# Patient Record
Sex: FEMALE | Race: WHITE | NOT HISPANIC OR LATINO
[De-identification: names, ages, dates, MRNs, and addresses within clinical notes are randomized per-mention and may not be internally consistent; named-entity substitution may affect disease eponyms.]

---

## 2017-11-14 ENCOUNTER — TRANSCRIPTION ENCOUNTER (OUTPATIENT)
Age: 62
End: 2017-11-14

## 2017-11-15 ENCOUNTER — RESULT REVIEW (OUTPATIENT)
Age: 62
End: 2017-11-15

## 2017-11-15 ENCOUNTER — OUTPATIENT (OUTPATIENT)
Dept: OUTPATIENT SERVICES | Facility: HOSPITAL | Age: 62
LOS: 1 days | End: 2017-11-15
Payer: COMMERCIAL

## 2017-11-15 DIAGNOSIS — Z12.11 ENCOUNTER FOR SCREENING FOR MALIGNANT NEOPLASM OF COLON: ICD-10-CM

## 2017-11-15 DIAGNOSIS — K21.0 GASTRO-ESOPHAGEAL REFLUX DISEASE WITH ESOPHAGITIS: ICD-10-CM

## 2017-11-15 PROCEDURE — 88305 TISSUE EXAM BY PATHOLOGIST: CPT | Mod: 26

## 2017-11-15 PROCEDURE — 88305 TISSUE EXAM BY PATHOLOGIST: CPT

## 2017-11-15 PROCEDURE — 88312 SPECIAL STAINS GROUP 1: CPT | Mod: 26

## 2017-11-15 PROCEDURE — 43239 EGD BIOPSY SINGLE/MULTIPLE: CPT

## 2017-11-15 PROCEDURE — G0121: CPT

## 2017-11-15 PROCEDURE — 88312 SPECIAL STAINS GROUP 1: CPT

## 2017-11-16 LAB — SURGICAL PATHOLOGY FINAL REPORT - CH: SIGNIFICANT CHANGE UP

## 2018-08-22 ENCOUNTER — APPOINTMENT (OUTPATIENT)
Dept: CV DIAGNOSTICS | Facility: HOSPITAL | Age: 63
End: 2018-08-22

## 2018-08-22 ENCOUNTER — OUTPATIENT (OUTPATIENT)
Dept: OUTPATIENT SERVICES | Facility: HOSPITAL | Age: 63
LOS: 1 days | End: 2018-08-22
Payer: COMMERCIAL

## 2018-08-22 DIAGNOSIS — I25.10 ATHEROSCLEROTIC HEART DISEASE OF NATIVE CORONARY ARTERY WITHOUT ANGINA PECTORIS: ICD-10-CM

## 2018-08-22 PROCEDURE — 78452 HT MUSCLE IMAGE SPECT MULT: CPT | Mod: 26

## 2018-08-22 PROCEDURE — 78452 HT MUSCLE IMAGE SPECT MULT: CPT

## 2018-08-22 PROCEDURE — 93017 CV STRESS TEST TRACING ONLY: CPT

## 2018-08-22 PROCEDURE — 93016 CV STRESS TEST SUPVJ ONLY: CPT

## 2018-08-22 PROCEDURE — 93018 CV STRESS TEST I&R ONLY: CPT

## 2018-08-22 PROCEDURE — A9500: CPT

## 2019-10-04 ENCOUNTER — APPOINTMENT (OUTPATIENT)
Dept: CARDIOLOGY | Facility: CLINIC | Age: 64
End: 2019-10-04

## 2022-07-31 ENCOUNTER — EMERGENCY (EMERGENCY)
Facility: HOSPITAL | Age: 67
LOS: 1 days | Discharge: HOME | End: 2022-07-31
Attending: STUDENT IN AN ORGANIZED HEALTH CARE EDUCATION/TRAINING PROGRAM | Admitting: STUDENT IN AN ORGANIZED HEALTH CARE EDUCATION/TRAINING PROGRAM
Payer: COMMERCIAL

## 2022-07-31 VITALS
SYSTOLIC BLOOD PRESSURE: 160 MMHG | HEART RATE: 81 BPM | OXYGEN SATURATION: 98 % | WEIGHT: 119.05 LBS | HEIGHT: 64 IN | RESPIRATION RATE: 17 BRPM | DIASTOLIC BLOOD PRESSURE: 88 MMHG | TEMPERATURE: 98 F

## 2022-07-31 PROCEDURE — 99283 EMERGENCY DEPT VISIT LOW MDM: CPT | Mod: 25

## 2022-07-31 PROCEDURE — 99283 EMERGENCY DEPT VISIT LOW MDM: CPT

## 2022-07-31 PROCEDURE — 90471 IMMUNIZATION ADMIN: CPT

## 2022-07-31 PROCEDURE — 90715 TDAP VACCINE 7 YRS/> IM: CPT

## 2022-07-31 RX ORDER — CEPHALEXIN 500 MG
500 CAPSULE ORAL ONCE
Refills: 0 | Status: COMPLETED | OUTPATIENT
Start: 2022-07-31 | End: 2022-07-31

## 2022-07-31 RX ORDER — CEPHALEXIN 500 MG
1 CAPSULE ORAL
Qty: 21 | Refills: 0
Start: 2022-07-31 | End: 2022-08-06

## 2022-07-31 RX ORDER — TETANUS TOXOID, REDUCED DIPHTHERIA TOXOID AND ACELLULAR PERTUSSIS VACCINE, ADSORBED 5; 2.5; 8; 8; 2.5 [IU]/.5ML; [IU]/.5ML; UG/.5ML; UG/.5ML; UG/.5ML
0.5 SUSPENSION INTRAMUSCULAR ONCE
Refills: 0 | Status: COMPLETED | OUTPATIENT
Start: 2022-07-31 | End: 2022-07-31

## 2022-07-31 RX ADMIN — Medication 500 MILLIGRAM(S): at 19:14

## 2022-07-31 RX ADMIN — TETANUS TOXOID, REDUCED DIPHTHERIA TOXOID AND ACELLULAR PERTUSSIS VACCINE, ADSORBED 0.5 MILLILITER(S): 5; 2.5; 8; 8; 2.5 SUSPENSION INTRAMUSCULAR at 19:03

## 2022-07-31 NOTE — ED PROVIDER NOTE - CLINICAL SUMMARY MEDICAL DECISION MAKING FREE TEXT BOX
I, Feliberto Angeles MD, have seen and examined the patient on the date of service.  I agree with the BLADIMIR's assessment as written, with exceptions or additions as noted below or in a separate note. patient cut her thumb cleaning a kitchen appliance. exam with avulsion injury of pulp. nvi. no nailbed injury. a/p: avulsion. do not suspect bony injury. will clear, provide wound care, keflex, tdap and f/u. family agreeable with plan. will give plastic surgery follow up.

## 2022-07-31 NOTE — ED PROVIDER NOTE - PROGRESS NOTE DETAILS
skin missing unable to suture xeroform dressing and pressure dressing applied bleeding controlled abx tdap f/u hand

## 2022-07-31 NOTE — ED PROVIDER NOTE - PATIENT PORTAL LINK FT
You can access the FollowMyHealth Patient Portal offered by [facility] by registering at the following website: [email or web address]. By joining [employer]’s FollowMyHealth portal, you will also be able to view your health information using other applications (apps) compatible with our system.

## 2022-07-31 NOTE — ED PROVIDER NOTE - NSFOLLOWUPINSTRUCTIONS_ED_ALL_ED_FT
Follow up with hand   take antibiotics as directed  return to er for any worsening symptoms     Laceration Without Closure    WHAT YOU NEED TO KNOW:    Your laceration has gone past the time to be closed. Lacerations in areas of poor blood flow usually need to be closed within 8 hours. In areas with normal blood flow, lacerations usually need to be closed within 12 hours. Facial lacerations need to be closed within 24 hours. Your laceration has been cleaned and a dressing has been applied. Your laceration will heal on its own without sutures, staples, or other closure devices.     DISCHARGE INSTRUCTIONS:    Return to the emergency department if:   •You have redness, pain, or fever that gets worse quickly.      •Your wound has a bad smell or has pus draining from it.      •You have bleeding that does not stop after 10 minutes of holding firm, direct pressure on your wound.      Contact your healthcare provider if: You have questions or concerns about your condition or care.    Wound care:   •Keep the wound dry for the first 24 to 48 hours or as directed. Wash your hands with soap and warm water before and after you care for your wound. After that, gently clean the wound once or twice a day with cool water. Use soap to clean around the wound, but try not to get any on the wound edges. Do not use alcohol or hydrogen peroxide to clean your wound unless you are directed to do so.      •Leave your bandage on as long as directed. Bandages keep your wound clean and protected. They can also prevent swelling. Ask how to change and how often to change your bandage. Ask if you should apply antibacterial ointment. Be careful not to wrap the bandage or tape too tightly. This could cut off blood flow and cause more injury. Change your bandages when they get wet or dirty.      Follow up with your healthcare provider within 2 days or as directed: Write down your questions so you remember to ask them during your visits.       © Copyright [employer] 2022           back to top                          © Copyright [employer] 2022

## 2022-07-31 NOTE — ED PROVIDER NOTE - CARE PROVIDER_API CALL
Nish Pisano (MD)  Plastic Surgery  [address], [address]  [place], [place] [id number]  Phone: [phone]  Fax: [phone]  Follow Up Time:     Antony Montgomery  PLASTIC SURGERY  [address] , [address] [address]  [place] [address]  Phone: [phone]  Fax: [phone]  Follow Up Time:

## 2022-07-31 NOTE — ED PROVIDER NOTE - OBJECTIVE STATEMENT
Patient is a 67-year-old right-handed female here for right thumb laceration right while cleaning a new mandolin.  Patient states was not able to control the bleeding with so came to ER Tdap is not up-to-date denies any numbness tingling weakness.

## 2022-08-01 ENCOUNTER — TRANSCRIPTION ENCOUNTER (OUTPATIENT)
Age: 67
End: 2022-08-01

## 2022-10-17 ENCOUNTER — NON-APPOINTMENT (OUTPATIENT)
Age: 67
End: 2022-10-17

## 2022-10-17 DIAGNOSIS — M81.0 AGE-RELATED OSTEOPOROSIS W/OUT CURRENT PATHOLOGICAL FRACTURE: ICD-10-CM

## 2022-10-17 DIAGNOSIS — K52.89 OTHER SPECIFIED NONINFECTIVE GASTROENTERITIS AND COLITIS: ICD-10-CM

## 2022-10-17 DIAGNOSIS — R91.1 SOLITARY PULMONARY NODULE: ICD-10-CM

## 2022-10-17 DIAGNOSIS — Z87.898 PERSONAL HISTORY OF OTHER SPECIFIED CONDITIONS: ICD-10-CM

## 2022-10-17 DIAGNOSIS — Z87.19 PERSONAL HISTORY OF OTHER DISEASES OF THE DIGESTIVE SYSTEM: ICD-10-CM

## 2022-10-17 DIAGNOSIS — Z87.891 PERSONAL HISTORY OF NICOTINE DEPENDENCE: ICD-10-CM

## 2022-10-17 DIAGNOSIS — Z82.0 FAMILY HISTORY OF EPILEPSY AND OTHER DISEASES OF THE NERVOUS SYSTEM: ICD-10-CM

## 2022-10-17 RX ORDER — DENOSUMAB 60 MG/ML
60 INJECTION SUBCUTANEOUS
Refills: 0 | Status: ACTIVE | COMMUNITY

## 2022-10-17 RX ORDER — MESALAMINE 1.2 G/1
1.2 TABLET, DELAYED RELEASE ORAL DAILY
Refills: 0 | Status: ACTIVE | COMMUNITY

## 2022-10-17 RX ORDER — LETROZOLE TABLETS 2.5 MG/1
2.5 TABLET, FILM COATED ORAL
Refills: 0 | Status: ACTIVE | COMMUNITY

## 2022-10-17 RX ORDER — VIT C/E/ZN/COPPR/LUTEIN/ZEAXAN 250MG-90MG
CAPSULE ORAL
Refills: 0 | Status: ACTIVE | COMMUNITY

## 2022-10-17 RX ORDER — ACETAMINOPHEN 650 MG/1
650 TABLET, EXTENDED RELEASE ORAL 4 TIMES DAILY
Refills: 0 | Status: ACTIVE | COMMUNITY

## 2022-10-17 RX ORDER — CALCIUM CARBONATE/VITAMIN D3 600MG-5MCG
600-5 TABLET ORAL
Refills: 0 | Status: ACTIVE | COMMUNITY

## 2022-10-17 RX ORDER — GUAIFENESIN AND DEXTROMETHORPHAN HBR 20; 400 MG/1; MG/1
TABLET ORAL
Refills: 0 | Status: ACTIVE | COMMUNITY

## 2022-10-17 RX ORDER — IPRATROPIUM BROMIDE AND ALBUTEROL SULFATE 2.5; .5 MG/3ML; MG/3ML
SOLUTION RESPIRATORY (INHALATION)
Refills: 0 | Status: ACTIVE | COMMUNITY

## 2022-10-17 RX ORDER — FAMOTIDINE 40 MG/1
40 TABLET, FILM COATED ORAL DAILY
Refills: 0 | Status: ACTIVE | COMMUNITY

## 2023-05-16 ENCOUNTER — APPOINTMENT (OUTPATIENT)
Dept: CARDIOLOGY | Facility: CLINIC | Age: 68
End: 2023-05-16
Payer: COMMERCIAL

## 2023-05-16 VITALS
DIASTOLIC BLOOD PRESSURE: 90 MMHG | HEART RATE: 76 BPM | SYSTOLIC BLOOD PRESSURE: 150 MMHG | BODY MASS INDEX: 19.97 KG/M2 | TEMPERATURE: 97.2 F | HEIGHT: 64 IN | WEIGHT: 117 LBS | OXYGEN SATURATION: 98 %

## 2023-05-16 DIAGNOSIS — Z00.00 ENCOUNTER FOR GENERAL ADULT MEDICAL EXAMINATION W/OUT ABNORMAL FINDINGS: ICD-10-CM

## 2023-05-16 PROCEDURE — 99213 OFFICE O/P EST LOW 20 MIN: CPT

## 2023-05-16 RX ORDER — HYDROXYCHLOROQUINE SULFATE 200 MG/1
200 TABLET, FILM COATED ORAL DAILY
Refills: 0 | Status: DISCONTINUED | COMMUNITY
End: 2023-05-16

## 2023-05-16 NOTE — REASON FOR VISIT
[Hypertension] : hypertension [FreeTextEntry1] : 67 years old female status post lumpectomy for CA of the right breast, hypertension comes to the office for follow-up of her hypertension

## 2023-05-16 NOTE — ASSESSMENT
[FreeTextEntry1] : Patient blood pressure seems to be well controlled–BP was 140/90 taken by me.  Patient continue Cozaar 50 mg p.o. once a day patient will be seen in the office in 3 months patient will have a complete blood work and physical examinations at that time

## 2023-06-12 ENCOUNTER — APPOINTMENT (OUTPATIENT)
Dept: CARDIOLOGY | Facility: CLINIC | Age: 68
End: 2023-06-12
Payer: COMMERCIAL

## 2023-06-12 VITALS
BODY MASS INDEX: 20.32 KG/M2 | WEIGHT: 119 LBS | HEIGHT: 64 IN | DIASTOLIC BLOOD PRESSURE: 83 MMHG | HEART RATE: 78 BPM | OXYGEN SATURATION: 98 % | SYSTOLIC BLOOD PRESSURE: 130 MMHG

## 2023-06-12 DIAGNOSIS — J20.9 ACUTE BRONCHITIS, UNSPECIFIED: ICD-10-CM

## 2023-06-12 DIAGNOSIS — J42 ACUTE BRONCHITIS, UNSPECIFIED: ICD-10-CM

## 2023-06-12 PROCEDURE — 99213 OFFICE O/P EST LOW 20 MIN: CPT

## 2023-06-12 RX ORDER — BENZONATATE 100 MG/1
100 CAPSULE ORAL 3 TIMES DAILY
Qty: 30 | Refills: 1 | Status: ACTIVE | COMMUNITY
Start: 2023-06-12 | End: 1900-01-01

## 2023-06-12 RX ORDER — LEVOFLOXACIN 500 MG/1
500 TABLET, FILM COATED ORAL DAILY
Qty: 10 | Refills: 0 | Status: ACTIVE | COMMUNITY
Start: 2023-06-12 | End: 1900-01-01

## 2023-06-12 RX ORDER — PREDNISONE 10 MG/1
10 TABLET ORAL
Qty: 1 | Refills: 0 | Status: ACTIVE | COMMUNITY
Start: 2023-06-12 | End: 1900-01-01

## 2023-06-12 NOTE — ASSESSMENT
[FreeTextEntry1] : Patient's symptoms are consistent with acute bronchitis.  Patient was given Levaquin, Tessalon, and prednisone milligram p.o. twice a day for 1 week 10 mg p.o. once a day for another 1week

## 2023-08-14 ENCOUNTER — APPOINTMENT (OUTPATIENT)
Dept: CARDIOLOGY | Facility: CLINIC | Age: 68
End: 2023-08-14
Payer: COMMERCIAL

## 2023-08-14 VITALS
HEIGHT: 64 IN | BODY MASS INDEX: 20.14 KG/M2 | OXYGEN SATURATION: 98 % | SYSTOLIC BLOOD PRESSURE: 131 MMHG | WEIGHT: 118 LBS | HEART RATE: 67 BPM | DIASTOLIC BLOOD PRESSURE: 78 MMHG

## 2023-08-14 DIAGNOSIS — Z98.890 OTHER SPECIFIED POSTPROCEDURAL STATES: ICD-10-CM

## 2023-08-14 DIAGNOSIS — C50.911 MALIGNANT NEOPLASM OF UNSPECIFIED SITE OF RIGHT FEMALE BREAST: ICD-10-CM

## 2023-08-14 DIAGNOSIS — I10 ESSENTIAL (PRIMARY) HYPERTENSION: ICD-10-CM

## 2023-08-14 DIAGNOSIS — M85.80 OTHER SPECIFIED DISORDERS OF BONE DENSITY AND STRUCTURE, UNSPECIFIED SITE: ICD-10-CM

## 2023-08-14 DIAGNOSIS — R00.2 PALPITATIONS: ICD-10-CM

## 2023-08-14 PROCEDURE — 93000 ELECTROCARDIOGRAM COMPLETE: CPT

## 2023-08-14 PROCEDURE — 99397 PER PM REEVAL EST PAT 65+ YR: CPT

## 2023-08-14 NOTE — HISTORY OF PRESENT ILLNESS
[FreeTextEntry1] : For comprehensive physical [de-identified] : 68 years old female with hypertension status post lumpectomy for C of the right breast comes to the office for comprehensive physical

## 2023-08-14 NOTE — ASSESSMENT
[FreeTextEntry1] : Patient's medications reviewed.  Patient continue present medication no changes are made.  Patient had colonoscopy done about 1 year ago.  Patient had blood work also recently.

## 2023-08-17 ENCOUNTER — RX RENEWAL (OUTPATIENT)
Age: 68
End: 2023-08-17

## 2023-08-17 RX ORDER — LOSARTAN POTASSIUM 100 MG/1
100 TABLET, FILM COATED ORAL DAILY
Qty: 90 | Refills: 3 | Status: ACTIVE | COMMUNITY
Start: 1900-01-01 | End: 1900-01-01

## 2024-10-03 ENCOUNTER — NON-APPOINTMENT (OUTPATIENT)
Age: 69
End: 2024-10-03

## 2024-10-03 ENCOUNTER — APPOINTMENT (OUTPATIENT)
Dept: CARDIOLOGY | Facility: CLINIC | Age: 69
End: 2024-10-03
Payer: COMMERCIAL

## 2024-10-03 VITALS
WEIGHT: 118 LBS | HEART RATE: 70 BPM | SYSTOLIC BLOOD PRESSURE: 146 MMHG | BODY MASS INDEX: 20.14 KG/M2 | DIASTOLIC BLOOD PRESSURE: 82 MMHG | OXYGEN SATURATION: 98 % | HEIGHT: 64 IN | TEMPERATURE: 98.2 F

## 2024-10-03 DIAGNOSIS — Z00.00 ENCOUNTER FOR GENERAL ADULT MEDICAL EXAMINATION W/OUT ABNORMAL FINDINGS: ICD-10-CM

## 2024-10-03 DIAGNOSIS — Z98.890 OTHER SPECIFIED POSTPROCEDURAL STATES: ICD-10-CM

## 2024-10-03 DIAGNOSIS — M81.0 AGE-RELATED OSTEOPOROSIS W/OUT CURRENT PATHOLOGICAL FRACTURE: ICD-10-CM

## 2024-10-03 DIAGNOSIS — C50.911 MALIGNANT NEOPLASM OF UNSPECIFIED SITE OF RIGHT FEMALE BREAST: ICD-10-CM

## 2024-10-03 DIAGNOSIS — I10 ESSENTIAL (PRIMARY) HYPERTENSION: ICD-10-CM

## 2024-10-03 PROCEDURE — 36415 COLL VENOUS BLD VENIPUNCTURE: CPT

## 2024-10-03 PROCEDURE — 99397 PER PM REEVAL EST PAT 65+ YR: CPT

## 2024-10-03 PROCEDURE — 93000 ELECTROCARDIOGRAM COMPLETE: CPT

## 2024-10-03 NOTE — HISTORY OF PRESENT ILLNESS
[de-identified] : 69 years old female with hypertension, status post lumpectomy for CA of the right breast comes to the office for annual wellness physical

## 2024-10-03 NOTE — ASSESSMENT
[FreeTextEntry1] : Patient was advised to see GI for possible repeat colonscopy.  Patient is being followed by oncologist.  Patient was advised also to see gynecology.  Patient will continue present medication.  Patient will have complete blood work

## 2024-10-07 LAB
ALBUMIN SERPL ELPH-MCNC: 4.6 G/DL
ALP BLD-CCNC: 55 U/L
ALT SERPL-CCNC: 18 U/L
ANION GAP SERPL CALC-SCNC: 10 MMOL/L
AST SERPL-CCNC: 15 U/L
BILIRUB SERPL-MCNC: 0.3 MG/DL
BUN SERPL-MCNC: 12 MG/DL
CALCIUM SERPL-MCNC: 10.5 MG/DL
CHLORIDE SERPL-SCNC: 101 MMOL/L
CHOLEST SERPL-MCNC: 198 MG/DL
CO2 SERPL-SCNC: 28 MMOL/L
CREAT SERPL-MCNC: 0.75 MG/DL
EGFR: 87 ML/MIN/1.73M2
GLUCOSE SERPL-MCNC: 93 MG/DL
HCT VFR BLD CALC: 44.3 %
HDLC SERPL-MCNC: 74 MG/DL
HGB BLD-MCNC: 14.2 G/DL
LDLC SERPL CALC-MCNC: 106 MG/DL
MCHC RBC-ENTMCNC: 30.9 PG
MCHC RBC-ENTMCNC: 32.1 GM/DL
MCV RBC AUTO: 96.5 FL
NONHDLC SERPL-MCNC: 124 MG/DL
PLATELET # BLD AUTO: 318 K/UL
POTASSIUM SERPL-SCNC: 4.7 MMOL/L
PROT SERPL-MCNC: 6.8 G/DL
RBC # BLD: 4.59 M/UL
RBC # FLD: 13.2 %
SODIUM SERPL-SCNC: 139 MMOL/L
T4 SERPL-MCNC: 7.1 UG/DL
TRIGL SERPL-MCNC: 102 MG/DL
TSH SERPL-ACNC: 0.89 UIU/ML
WBC # FLD AUTO: 7.68 K/UL

## 2025-01-03 ENCOUNTER — NON-APPOINTMENT (OUTPATIENT)
Age: 70
End: 2025-01-03

## 2025-01-03 ENCOUNTER — APPOINTMENT (OUTPATIENT)
Dept: CARDIOLOGY | Facility: CLINIC | Age: 70
End: 2025-01-03
Payer: COMMERCIAL

## 2025-01-03 VITALS
DIASTOLIC BLOOD PRESSURE: 90 MMHG | BODY MASS INDEX: 20.14 KG/M2 | SYSTOLIC BLOOD PRESSURE: 148 MMHG | WEIGHT: 118 LBS | HEIGHT: 64 IN | HEART RATE: 82 BPM | TEMPERATURE: 97.4 F | OXYGEN SATURATION: 97 %

## 2025-01-03 DIAGNOSIS — Z98.890 OTHER SPECIFIED POSTPROCEDURAL STATES: ICD-10-CM

## 2025-01-03 DIAGNOSIS — I10 ESSENTIAL (PRIMARY) HYPERTENSION: ICD-10-CM

## 2025-01-03 PROCEDURE — 99213 OFFICE O/P EST LOW 20 MIN: CPT

## 2025-01-03 RX ORDER — MESALAMINE 1.2 G/1
1.2 TABLET, DELAYED RELEASE ORAL
Refills: 0 | Status: ACTIVE | COMMUNITY

## 2025-01-03 RX ORDER — PROPYLENE GLYCOL 0.06 MG/ML
SOLUTION/ DROPS OPHTHALMIC
Refills: 0 | Status: ACTIVE | COMMUNITY

## 2025-01-03 RX ORDER — MELOXICAM 5 MG/1
5 CAPSULE ORAL
Refills: 0 | Status: ACTIVE | COMMUNITY

## 2025-01-16 ENCOUNTER — OUTPATIENT (OUTPATIENT)
Dept: OUTPATIENT SERVICES | Facility: HOSPITAL | Age: 70
LOS: 1 days | End: 2025-01-16
Payer: COMMERCIAL

## 2025-01-16 ENCOUNTER — APPOINTMENT (OUTPATIENT)
Dept: RADIOLOGY | Facility: CLINIC | Age: 70
End: 2025-01-16

## 2025-01-16 DIAGNOSIS — Z00.8 ENCOUNTER FOR OTHER GENERAL EXAMINATION: ICD-10-CM

## 2025-01-16 PROCEDURE — 72100 X-RAY EXAM L-S SPINE 2/3 VWS: CPT | Mod: 26

## 2025-01-16 PROCEDURE — 72070 X-RAY EXAM THORAC SPINE 2VWS: CPT | Mod: 26

## 2025-01-16 PROCEDURE — 72100 X-RAY EXAM L-S SPINE 2/3 VWS: CPT

## 2025-01-16 PROCEDURE — 72070 X-RAY EXAM THORAC SPINE 2VWS: CPT

## 2025-03-14 ENCOUNTER — APPOINTMENT (OUTPATIENT)
Dept: CARDIOLOGY | Facility: CLINIC | Age: 70
End: 2025-03-14
Payer: COMMERCIAL

## 2025-03-14 VITALS
BODY MASS INDEX: 20.14 KG/M2 | SYSTOLIC BLOOD PRESSURE: 149 MMHG | OXYGEN SATURATION: 98 % | HEIGHT: 64 IN | WEIGHT: 118 LBS | DIASTOLIC BLOOD PRESSURE: 89 MMHG | HEART RATE: 81 BPM

## 2025-03-14 DIAGNOSIS — I10 ESSENTIAL (PRIMARY) HYPERTENSION: ICD-10-CM

## 2025-03-14 DIAGNOSIS — M81.0 AGE-RELATED OSTEOPOROSIS W/OUT CURRENT PATHOLOGICAL FRACTURE: ICD-10-CM

## 2025-03-14 DIAGNOSIS — Z98.890 OTHER SPECIFIED POSTPROCEDURAL STATES: ICD-10-CM

## 2025-03-14 DIAGNOSIS — R91.1 SOLITARY PULMONARY NODULE: ICD-10-CM

## 2025-03-14 PROCEDURE — 99213 OFFICE O/P EST LOW 20 MIN: CPT

## 2025-03-28 ENCOUNTER — NON-APPOINTMENT (OUTPATIENT)
Age: 70
End: 2025-03-28

## 2025-04-08 ENCOUNTER — APPOINTMENT (OUTPATIENT)
Dept: THORACIC SURGERY | Facility: CLINIC | Age: 70
End: 2025-04-08
Payer: COMMERCIAL

## 2025-04-08 VITALS
RESPIRATION RATE: 16 BRPM | HEART RATE: 85 BPM | OXYGEN SATURATION: 96 % | DIASTOLIC BLOOD PRESSURE: 93 MMHG | SYSTOLIC BLOOD PRESSURE: 178 MMHG | WEIGHT: 117 LBS | HEIGHT: 64 IN | BODY MASS INDEX: 19.97 KG/M2

## 2025-04-08 DIAGNOSIS — R91.1 SOLITARY PULMONARY NODULE: ICD-10-CM

## 2025-04-08 PROCEDURE — 99205 OFFICE O/P NEW HI 60 MIN: CPT

## 2025-04-08 RX ORDER — LETROZOLE 2.5 MG/1
2.5 TABLET, FILM COATED ORAL
Refills: 0 | Status: ACTIVE | COMMUNITY

## 2025-04-08 RX ORDER — FAMOTIDINE 40 MG/1
40 TABLET, FILM COATED ORAL
Refills: 0 | Status: ACTIVE | COMMUNITY

## 2025-05-05 ENCOUNTER — NON-APPOINTMENT (OUTPATIENT)
Age: 70
End: 2025-05-05

## 2025-05-05 ENCOUNTER — APPOINTMENT (OUTPATIENT)
Dept: CARDIOLOGY | Facility: CLINIC | Age: 70
End: 2025-05-05
Payer: COMMERCIAL

## 2025-05-05 VITALS
SYSTOLIC BLOOD PRESSURE: 152 MMHG | HEART RATE: 81 BPM | WEIGHT: 11 LBS | OXYGEN SATURATION: 97 % | HEIGHT: 64 IN | DIASTOLIC BLOOD PRESSURE: 84 MMHG | BODY MASS INDEX: 1.88 KG/M2

## 2025-05-05 DIAGNOSIS — R91.1 SOLITARY PULMONARY NODULE: ICD-10-CM

## 2025-05-05 DIAGNOSIS — M81.0 AGE-RELATED OSTEOPOROSIS W/OUT CURRENT PATHOLOGICAL FRACTURE: ICD-10-CM

## 2025-05-05 DIAGNOSIS — Z98.890 OTHER SPECIFIED POSTPROCEDURAL STATES: ICD-10-CM

## 2025-05-05 DIAGNOSIS — I10 ESSENTIAL (PRIMARY) HYPERTENSION: ICD-10-CM

## 2025-05-05 DIAGNOSIS — C50.911 MALIGNANT NEOPLASM OF UNSPECIFIED SITE OF RIGHT FEMALE BREAST: ICD-10-CM

## 2025-05-05 PROCEDURE — 99214 OFFICE O/P EST MOD 30 MIN: CPT

## 2025-05-05 PROCEDURE — 93000 ELECTROCARDIOGRAM COMPLETE: CPT

## 2025-05-21 ENCOUNTER — OUTPATIENT (OUTPATIENT)
Dept: OUTPATIENT SERVICES | Facility: HOSPITAL | Age: 70
LOS: 1 days | End: 2025-05-21

## 2025-05-21 VITALS
WEIGHT: 115.96 LBS | OXYGEN SATURATION: 97 % | SYSTOLIC BLOOD PRESSURE: 170 MMHG | TEMPERATURE: 98 F | HEIGHT: 62 IN | DIASTOLIC BLOOD PRESSURE: 81 MMHG

## 2025-05-21 DIAGNOSIS — Z98.890 OTHER SPECIFIED POSTPROCEDURAL STATES: Chronic | ICD-10-CM

## 2025-05-21 DIAGNOSIS — Z98.891 HISTORY OF UTERINE SCAR FROM PREVIOUS SURGERY: Chronic | ICD-10-CM

## 2025-05-21 DIAGNOSIS — R91.1 SOLITARY PULMONARY NODULE: ICD-10-CM

## 2025-05-21 LAB
ANION GAP SERPL CALC-SCNC: 15 MMOL/L — HIGH (ref 7–14)
BASOPHILS # BLD AUTO: 0.1 K/UL — SIGNIFICANT CHANGE UP (ref 0–0.2)
BASOPHILS NFR BLD AUTO: 1.1 % — SIGNIFICANT CHANGE UP (ref 0–2)
BLD GP AB SCN SERPL QL: NEGATIVE — SIGNIFICANT CHANGE UP
BUN SERPL-MCNC: 13 MG/DL — SIGNIFICANT CHANGE UP (ref 7–23)
CALCIUM SERPL-MCNC: 10.3 MG/DL — SIGNIFICANT CHANGE UP (ref 8.4–10.5)
CHLORIDE SERPL-SCNC: 98 MMOL/L — SIGNIFICANT CHANGE UP (ref 98–107)
CO2 SERPL-SCNC: 24 MMOL/L — SIGNIFICANT CHANGE UP (ref 22–31)
CREAT SERPL-MCNC: 0.62 MG/DL — SIGNIFICANT CHANGE UP (ref 0.5–1.3)
EGFR: 97 ML/MIN/1.73M2 — SIGNIFICANT CHANGE UP
EGFR: 97 ML/MIN/1.73M2 — SIGNIFICANT CHANGE UP
EOSINOPHIL # BLD AUTO: 0.13 K/UL — SIGNIFICANT CHANGE UP (ref 0–0.5)
EOSINOPHIL NFR BLD AUTO: 1.4 % — SIGNIFICANT CHANGE UP (ref 0–6)
GLUCOSE SERPL-MCNC: 83 MG/DL — SIGNIFICANT CHANGE UP (ref 70–99)
HCT VFR BLD CALC: 46.7 % — HIGH (ref 34.5–45)
HGB BLD-MCNC: 15.6 G/DL — HIGH (ref 11.5–15.5)
IMM GRANULOCYTES NFR BLD AUTO: 0.1 % — SIGNIFICANT CHANGE UP (ref 0–0.9)
LYMPHOCYTES # BLD AUTO: 1.4 K/UL — SIGNIFICANT CHANGE UP (ref 1–3.3)
LYMPHOCYTES # BLD AUTO: 15.4 % — SIGNIFICANT CHANGE UP (ref 13–44)
MCHC RBC-ENTMCNC: 31.5 PG — SIGNIFICANT CHANGE UP (ref 27–34)
MCHC RBC-ENTMCNC: 33.4 G/DL — SIGNIFICANT CHANGE UP (ref 32–36)
MCV RBC AUTO: 94.2 FL — SIGNIFICANT CHANGE UP (ref 80–100)
MONOCYTES # BLD AUTO: 0.56 K/UL — SIGNIFICANT CHANGE UP (ref 0–0.9)
MONOCYTES NFR BLD AUTO: 6.1 % — SIGNIFICANT CHANGE UP (ref 2–14)
NEUTROPHILS # BLD AUTO: 6.92 K/UL — SIGNIFICANT CHANGE UP (ref 1.8–7.4)
NEUTROPHILS NFR BLD AUTO: 75.9 % — SIGNIFICANT CHANGE UP (ref 43–77)
PLATELET # BLD AUTO: 352 K/UL — SIGNIFICANT CHANGE UP (ref 150–400)
POTASSIUM SERPL-MCNC: 4.1 MMOL/L — SIGNIFICANT CHANGE UP (ref 3.5–5.3)
POTASSIUM SERPL-SCNC: 4.1 MMOL/L — SIGNIFICANT CHANGE UP (ref 3.5–5.3)
RBC # BLD: 4.96 M/UL — SIGNIFICANT CHANGE UP (ref 3.8–5.2)
RBC # FLD: 13.1 % — SIGNIFICANT CHANGE UP (ref 10.3–14.5)
RH IG SCN BLD-IMP: POSITIVE — SIGNIFICANT CHANGE UP
RH IG SCN BLD-IMP: POSITIVE — SIGNIFICANT CHANGE UP
SODIUM SERPL-SCNC: 137 MMOL/L — SIGNIFICANT CHANGE UP (ref 135–145)
WBC # BLD: 9.12 K/UL — SIGNIFICANT CHANGE UP (ref 3.8–10.5)
WBC # FLD AUTO: 9.12 K/UL — SIGNIFICANT CHANGE UP (ref 3.8–10.5)

## 2025-05-21 NOTE — H&P PST ADULT - RESPIRATORY
clear to auscultation bilaterally/no wheezes/no rales/no rhonchi/no respiratory distress clear to auscultation bilaterally/no wheezes/no rales/no rhonchi/no respiratory distress/breath sounds equal/good air movement/respirations non-labored

## 2025-05-21 NOTE — H&P PST ADULT - PROBLEM SELECTOR PLAN 1
Schedule for Flexible bronchoscopy, left Video Assisted Thoracoscopic Surgery (VATS), left upper lobectomy tentatively on 05/28/2025. Pre op instructions, famotidine, chlorhexidine gluconate soap given and explained. Pt verbalized understanding.  ?

## 2025-05-21 NOTE — H&P PST ADULT - NSICDXPASTSURGICALHX_GEN_ALL_CORE_FT
PAST SURGICAL HISTORY:  History of  section      PAST SURGICAL HISTORY:  History of  section     History of colonoscopy     History of lumpectomy of right breast     Status post excisional biopsy

## 2025-05-21 NOTE — H&P PST ADULT - MUSCULOSKELETAL
details… ROM intact/no joint erythema/no joint warmth/no calf tenderness/normal gait/strength 5/5 bilateral upper extremities/strength 5/5 bilateral lower extremities bilateral bunions/ROM intact/no joint erythema/no joint warmth/no calf tenderness/normal gait/strength 5/5 bilateral upper extremities/strength 5/5 bilateral lower extremities

## 2025-05-21 NOTE — H&P PST ADULT - NEGATIVE ENMT SYMPTOMS
no hearing difficulty/no ear pain/no vertigo/no sinus symptoms/no post-nasal discharge/no nose bleeds/no throat pain/no dysphagia

## 2025-05-21 NOTE — H&P PST ADULT - NSICDXPASTMEDICALHX_GEN_ALL_CORE_FT
PAST MEDICAL HISTORY:  GERD (gastroesophageal reflux disease)     History of radiation therapy     HTN (hypertension)     Osteopenia      PAST MEDICAL HISTORY:  GERD (gastroesophageal reflux disease)     History of radiation therapy     HTN (hypertension)     OA (osteoarthritis)     Osteopenia     Ulcerative colitis      PAST MEDICAL HISTORY:  GERD (gastroesophageal reflux disease)     History of radiation therapy     HTN (hypertension)     OA (osteoarthritis)     Osteopenia     Solitary pulmonary nodule     Ulcerative colitis

## 2025-05-21 NOTE — H&P PST ADULT - NSANTHOSAYNRD_GEN_A_CORE
No. VOLODYMYR screening performed.  STOP BANG Legend: 0-2 = LOW Risk; 3-4 = INTERMEDIATE Risk; 5-8 = HIGH Risk

## 2025-05-21 NOTE — H&P PST ADULT - PSYCHIATRIC
details… normal affect/alert and oriented x3/normal behavior normal affect/alert and oriented x3/anxious

## 2025-05-21 NOTE — H&P PST ADULT - HISTORY OF PRESENT ILLNESS
69 year old female, former smoker (22 years, 1-2 ppd), w/ hx of Breast Cancer (Stage IA - 2018, s/p right lumpectomy and radiation), GERD, HTN, colitis, osteopenia, and now with new left lung nodule. Referred by Dr. Hon Edouard for CT Sx evaluation.    incidental finding 2018 - Ct scan . being followed by ONC / Pulm, S/P Ct scan of abd./pelvis (2022) showed something at the top of the lung. Ct scan of lung 03/2025. Pt was referred to surgeon by oncologist. S/P PET / Ct  scan 04/2025 showed that the growth on the top of the lung has gotten progressively bigger  ?  CT Chest on 3/11/25 ([facility]):  - Multiple bilateral pulmonary nodules to large extent unchanged.  - New hazy changes in the posterior LLL likely due to interval scarring or postinfectious or inflammatory changes since prior chest CT.  - New semisolid/semigroundglass NURIA 1 cm nodule.  - Previously noted left major fissure nodular change as described above may also represent interval scarring.  - Few additional tiny bilateral lower lobe nodules.  - Likely small hiatal hernia.  69 year old female, former smoker (22 years, 1-2 ppd), w/ hx of Breast Cancer (Stage IA - 2018, s/p right lumpectomy and radiation), GERD, HTN, Ulcerative colitis, osteopenia, presents for pre op evaluation with h/o lung nodule found incidentally in 2018 following breast surgery via Ct scan. Pt had been following up with ONC / Pulm, S/P Ct scan of abd./pelvis (2022) showed" something at the top of the lung". Ct scan of lung 03/2025. Pt was then referred to surgeon by oncologist. S/P PET / Ct  scan 04/2025 showed "that the growth on the top of the lung has gotten progressively bigger". Now schedule for Flexible bronchoscopy, left Video Assisted Thoracoscopic Surgery (VATS), left upper lobectomy tentatively on 05/21/2028.  ?  CT Chest on 3/11/25 ([facility]):  - Multiple bilateral pulmonary nodules to large extent unchanged.  - New hazy changes in the posterior LLL likely due to interval scarring or postinfectious or inflammatory changes since prior chest CT.  - New semisolid/semigroundglass NURIA 1 cm nodule.  - Previously noted left major fissure nodular change as described above may also represent interval scarring.  - Few additional tiny bilateral lower lobe nodules.  - Likely small hiatal hernia.  69 year old female, former smoker (22 years, 1-2 ppd), w/ hx of Breast Cancer (Stage IA - 2018, s/p right lumpectomy and radiation), GERD, HTN, Ulcerative colitis, osteopenia, presents for pre op evaluation with h/o lung nodule found incidentally in 2018 following breast surgery via Ct scan. Pt had been following up with ONC / Pulm, S/P Ct scan of abd./pelvis (2022) showed" something at the top of the lung". Ct scan of lung 03/2025. Pt was then referred to surgeon by oncologist. S/P PET / Ct  scan 04/2025 showed "that the growth on the top of the lung has gotten progressively bigger". Now schedule for Flexible bronchoscopy, left Video Assisted Thoracoscopic Surgery (VATS), left upper lobectomy tentatively on 05/28/2025.  ?  CT Chest on 3/11/25 ([facility]):  - Multiple bilateral pulmonary nodules to large extent unchanged.  - New hazy changes in the posterior LLL likely due to interval scarring or postinfectious or inflammatory changes since prior chest CT.  - New semisolid/semigroundglass NURIA 1 cm nodule.  - Previously noted left major fissure nodular change as described above may also represent interval scarring.  - Few additional tiny bilateral lower lobe nodules.  - Likely small hiatal hernia.

## 2025-05-21 NOTE — H&P PST ADULT - ASSESSMENT
68 y/o f presents with pre op diagnosis: solitary pulmonary nodule 70 y/o female presents with pre op diagnosis: solitary pulmonary nodule

## 2025-05-21 NOTE — H&P PST ADULT - OTHER CARE PROVIDERS
Hon Wyatt (ONC);                              Dr. Lorena Gibbs (Pulm) Hon Wyatt (Hem/ONC) [phone]       Dr. Lorena Gibbs (Pulm) [phone]

## 2025-05-23 DIAGNOSIS — J42 ACUTE BRONCHITIS, UNSPECIFIED: ICD-10-CM

## 2025-05-23 DIAGNOSIS — J20.9 ACUTE BRONCHITIS, UNSPECIFIED: ICD-10-CM

## 2025-05-23 PROBLEM — Z92.3 PERSONAL HISTORY OF IRRADIATION: Chronic | Status: ACTIVE | Noted: 2025-05-21

## 2025-05-23 PROBLEM — M85.80 OTHER SPECIFIED DISORDERS OF BONE DENSITY AND STRUCTURE, UNSPECIFIED SITE: Chronic | Status: ACTIVE | Noted: 2025-05-21

## 2025-05-23 PROBLEM — K51.90 ULCERATIVE COLITIS, UNSPECIFIED, WITHOUT COMPLICATIONS: Chronic | Status: ACTIVE | Noted: 2025-05-21

## 2025-05-23 PROBLEM — R91.1 SOLITARY PULMONARY NODULE: Chronic | Status: ACTIVE | Noted: 2025-05-21

## 2025-05-23 PROBLEM — K21.9 GASTRO-ESOPHAGEAL REFLUX DISEASE WITHOUT ESOPHAGITIS: Chronic | Status: ACTIVE | Noted: 2025-05-21

## 2025-05-23 PROBLEM — I10 ESSENTIAL (PRIMARY) HYPERTENSION: Chronic | Status: ACTIVE | Noted: 2025-05-21

## 2025-05-23 PROBLEM — M19.90 UNSPECIFIED OSTEOARTHRITIS, UNSPECIFIED SITE: Chronic | Status: ACTIVE | Noted: 2025-05-21

## 2025-05-27 ENCOUNTER — APPOINTMENT (OUTPATIENT)
Dept: PULMONOLOGY | Facility: CLINIC | Age: 70
End: 2025-05-27
Payer: COMMERCIAL

## 2025-05-27 PROCEDURE — 94010 BREATHING CAPACITY TEST: CPT

## 2025-05-27 PROCEDURE — 94729 DIFFUSING CAPACITY: CPT

## 2025-05-27 PROCEDURE — 94726 PLETHYSMOGRAPHY LUNG VOLUMES: CPT

## 2025-05-27 NOTE — ASU PATIENT PROFILE, ADULT - PRESSURE ULCER(S)
[May participate in all age-appropriate activities] : [unfilled] May participate in all age-appropriate activities. 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 It was found to be associated with cardiac defects in 44% of individuals. When it is associated with congenital heart disease it has also been called Mo cardiofacial syndrome. It has been associated with 22 q11 deletion.   \par - His echocardiogram was thankfully normal. \par - There were deep septal q waves seen on the ECG, which is suggestive of left ventricular hypertrophy. There was no ventricular hypertrophy seen on his echocardiogram, which is a more specific test. It may be a normal variant but should be followed.\par - No restrictions are needed\par - Routine pediatric cardiology follow-up in 6 months with repeat ECG or sooner if there are any further cardiac concerns in the future. \par - The family verbalized understanding, and all questions were answered.\par \par \par  [Needs SBE Prophylaxis] : [unfilled] does not need bacterial endocarditis prophylaxis no

## 2025-05-27 NOTE — ASU PATIENT PROFILE, ADULT - NSICDXPASTMEDICALHX_GEN_ALL_CORE_FT
PAST MEDICAL HISTORY:  GERD (gastroesophageal reflux disease)     History of radiation therapy     HTN (hypertension)     OA (osteoarthritis)     Osteopenia     Solitary pulmonary nodule     Ulcerative colitis

## 2025-05-27 NOTE — ASU PATIENT PROFILE, ADULT - NSICDXPASTSURGICALHX_GEN_ALL_CORE_FT
PAST SURGICAL HISTORY:  History of  section     History of colonoscopy     History of lumpectomy of right breast     Status post excisional biopsy

## 2025-05-28 ENCOUNTER — TRANSCRIPTION ENCOUNTER (OUTPATIENT)
Age: 70
End: 2025-05-28

## 2025-05-28 ENCOUNTER — APPOINTMENT (OUTPATIENT)
Dept: THORACIC SURGERY | Facility: HOSPITAL | Age: 70
End: 2025-05-28

## 2025-05-28 ENCOUNTER — INPATIENT (INPATIENT)
Facility: HOSPITAL | Age: 70
LOS: 0 days | Discharge: HOME | End: 2025-05-29
Attending: THORACIC SURGERY (CARDIOTHORACIC VASCULAR SURGERY) | Admitting: THORACIC SURGERY (CARDIOTHORACIC VASCULAR SURGERY)
Payer: COMMERCIAL

## 2025-05-28 VITALS
HEIGHT: 62 IN | WEIGHT: 115.96 LBS | RESPIRATION RATE: 16 BRPM | DIASTOLIC BLOOD PRESSURE: 80 MMHG | TEMPERATURE: 98 F | SYSTOLIC BLOOD PRESSURE: 144 MMHG | OXYGEN SATURATION: 97 % | HEART RATE: 87 BPM

## 2025-05-28 DIAGNOSIS — Z98.890 OTHER SPECIFIED POSTPROCEDURAL STATES: Chronic | ICD-10-CM

## 2025-05-28 DIAGNOSIS — Z98.891 HISTORY OF UTERINE SCAR FROM PREVIOUS SURGERY: Chronic | ICD-10-CM

## 2025-05-28 DIAGNOSIS — R91.1 SOLITARY PULMONARY NODULE: ICD-10-CM

## 2025-05-28 LAB
MRSA PCR RESULT.: SIGNIFICANT CHANGE UP
S AUREUS DNA NOSE QL NAA+PROBE: SIGNIFICANT CHANGE UP

## 2025-05-28 PROCEDURE — 88313 SPECIAL STAINS GROUP 2: CPT | Mod: 26

## 2025-05-28 PROCEDURE — 32674 THORACOSCOPY LYMPH NODE EXC: CPT | Mod: AS,LT

## 2025-05-28 PROCEDURE — 71045 X-RAY EXAM CHEST 1 VIEW: CPT | Mod: 26

## 2025-05-28 PROCEDURE — 32663 THORACOSCOPY W/LOBECTOMY: CPT | Mod: AS,LT

## 2025-05-28 PROCEDURE — 88341 IMHCHEM/IMCYTCHM EA ADD ANTB: CPT | Mod: 26

## 2025-05-28 PROCEDURE — 88342 IMHCHEM/IMCYTCHM 1ST ANTB: CPT | Mod: 26

## 2025-05-28 PROCEDURE — 32674 THORACOSCOPY LYMPH NODE EXC: CPT | Mod: LT

## 2025-05-28 PROCEDURE — 32663 THORACOSCOPY W/LOBECTOMY: CPT | Mod: LT

## 2025-05-28 PROCEDURE — S2900 ROBOTIC SURGICAL SYSTEM: CPT | Mod: NC

## 2025-05-28 PROCEDURE — 88309 TISSUE EXAM BY PATHOLOGIST: CPT | Mod: 26

## 2025-05-28 PROCEDURE — 88305 TISSUE EXAM BY PATHOLOGIST: CPT | Mod: 26

## 2025-05-28 PROCEDURE — 99233 SBSQ HOSP IP/OBS HIGH 50: CPT

## 2025-05-28 DEVICE — STAPLER COVIDIEN TRI-STAPLE CURVED 30MM TAN RELOAD: Type: IMPLANTABLE DEVICE | Site: LEFT | Status: FUNCTIONAL

## 2025-05-28 DEVICE — CHEST DRAIN THORACIC ARGYLE PVC 20FR STRAIGHT: Type: IMPLANTABLE DEVICE | Site: LEFT | Status: FUNCTIONAL

## 2025-05-28 DEVICE — STAPLER COVIDIEN TRI-STAPLE CURVED 45MM PURPLE RELOAD: Type: IMPLANTABLE DEVICE | Site: LEFT | Status: FUNCTIONAL

## 2025-05-28 DEVICE — STAPLER COVIDIEN TRI-STAPLE CURVED 45MM TAN RELOAD: Type: IMPLANTABLE DEVICE | Site: LEFT | Status: FUNCTIONAL

## 2025-05-28 DEVICE — LIGATING CLIPS WECK HEMOLOK POLYMER MEDIUM-LARGE (GREEN) 6: Type: IMPLANTABLE DEVICE | Site: LEFT | Status: FUNCTIONAL

## 2025-05-28 RX ORDER — SENNA 187 MG
2 TABLET ORAL AT BEDTIME
Refills: 0 | Status: DISCONTINUED | OUTPATIENT
Start: 2025-05-28 | End: 2025-05-29

## 2025-05-28 RX ORDER — ACETAMINOPHEN 500 MG/5ML
1000 LIQUID (ML) ORAL EVERY 8 HOURS
Refills: 0 | Status: DISCONTINUED | OUTPATIENT
Start: 2025-05-28 | End: 2025-05-28

## 2025-05-28 RX ORDER — CALCIUM CARBONATE/VITAMIN D3 500MG-5MCG
1 TABLET ORAL
Refills: 0 | Status: DISCONTINUED | OUTPATIENT
Start: 2025-05-28 | End: 2025-05-29

## 2025-05-28 RX ORDER — LEVALBUTEROL HYDROCHLORIDE 1.25 MG/3ML
0.63 SOLUTION RESPIRATORY (INHALATION) EVERY 6 HOURS
Refills: 0 | Status: DISCONTINUED | OUTPATIENT
Start: 2025-05-28 | End: 2025-05-29

## 2025-05-28 RX ORDER — LOSARTAN POTASSIUM 100 MG/1
1 TABLET, FILM COATED ORAL
Refills: 0 | DISCHARGE

## 2025-05-28 RX ORDER — ONDANSETRON HCL/PF 4 MG/2 ML
4 VIAL (ML) INJECTION EVERY 6 HOURS
Refills: 0 | Status: DISCONTINUED | OUTPATIENT
Start: 2025-05-28 | End: 2025-05-29

## 2025-05-28 RX ORDER — NALOXONE HYDROCHLORIDE 0.4 MG/ML
0.1 INJECTION, SOLUTION INTRAMUSCULAR; INTRAVENOUS; SUBCUTANEOUS
Refills: 0 | Status: DISCONTINUED | OUTPATIENT
Start: 2025-05-28 | End: 2025-05-29

## 2025-05-28 RX ORDER — LANOLIN/MINERAL OIL/PETROLATUM
2 OINTMENT (GRAM) OPHTHALMIC (EYE)
Refills: 0 | Status: DISCONTINUED | OUTPATIENT
Start: 2025-05-28 | End: 2025-05-29

## 2025-05-28 RX ORDER — LETROZOLE 2.5 MG/1
2.5 TABLET, FILM COATED ORAL AT BEDTIME
Refills: 0 | Status: DISCONTINUED | OUTPATIENT
Start: 2025-05-28 | End: 2025-05-29

## 2025-05-28 RX ORDER — SODIUM CHLORIDE 9 G/1000ML
1000 INJECTION, SOLUTION INTRAVENOUS
Refills: 0 | Status: DISCONTINUED | OUTPATIENT
Start: 2025-05-28 | End: 2025-05-29

## 2025-05-28 RX ORDER — LETROZOLE 2.5 MG/1
1 TABLET, FILM COATED ORAL
Refills: 0 | DISCHARGE

## 2025-05-28 RX ORDER — POLYETHYLENE GLYCOL 3350 17 G/17G
17 POWDER, FOR SOLUTION ORAL DAILY
Refills: 0 | Status: DISCONTINUED | OUTPATIENT
Start: 2025-05-28 | End: 2025-05-29

## 2025-05-28 RX ORDER — METOPROLOL SUCCINATE 50 MG/1
2.5 TABLET, EXTENDED RELEASE ORAL ONCE
Refills: 0 | Status: COMPLETED | OUTPATIENT
Start: 2025-05-28 | End: 2025-05-28

## 2025-05-28 RX ORDER — HYDROMORPHONE/SOD CHLOR,ISO/PF 2 MG/10 ML
30 SYRINGE (ML) INJECTION
Refills: 0 | Status: DISCONTINUED | OUTPATIENT
Start: 2025-05-28 | End: 2025-05-29

## 2025-05-28 RX ORDER — ACETAMINOPHEN 500 MG/5ML
975 LIQUID (ML) ORAL ONCE
Refills: 0 | Status: COMPLETED | OUTPATIENT
Start: 2025-05-28 | End: 2025-05-28

## 2025-05-28 RX ORDER — SODIUM CHLORIDE 9 G/1000ML
1000 INJECTION, SOLUTION INTRAVENOUS
Refills: 0 | Status: DISCONTINUED | OUTPATIENT
Start: 2025-05-28 | End: 2025-05-28

## 2025-05-28 RX ORDER — HEPARIN SODIUM 1000 [USP'U]/ML
5000 INJECTION INTRAVENOUS; SUBCUTANEOUS ONCE
Refills: 0 | Status: COMPLETED | OUTPATIENT
Start: 2025-05-28 | End: 2025-05-28

## 2025-05-28 RX ORDER — ACETAMINOPHEN 500 MG/5ML
1000 LIQUID (ML) ORAL EVERY 6 HOURS
Refills: 0 | Status: DISCONTINUED | OUTPATIENT
Start: 2025-05-28 | End: 2025-05-29

## 2025-05-28 RX ORDER — DORNASE ALFA 1 MG/ML
2.5 SOLUTION RESPIRATORY (INHALATION) DAILY
Refills: 0 | Status: DISCONTINUED | OUTPATIENT
Start: 2025-05-28 | End: 2025-05-29

## 2025-05-28 RX ORDER — IPRATROPIUM BROMIDE 42 UG/1
2 SPRAY NASAL
Refills: 0 | DISCHARGE

## 2025-05-28 RX ORDER — LOSARTAN POTASSIUM 100 MG/1
50 TABLET, FILM COATED ORAL DAILY
Refills: 0 | Status: DISCONTINUED | OUTPATIENT
Start: 2025-05-28 | End: 2025-05-28

## 2025-05-28 RX ORDER — HEPARIN SODIUM 1000 [USP'U]/ML
5000 INJECTION INTRAVENOUS; SUBCUTANEOUS EVERY 8 HOURS
Refills: 0 | Status: DISCONTINUED | OUTPATIENT
Start: 2025-05-28 | End: 2025-05-29

## 2025-05-28 RX ORDER — LOSARTAN POTASSIUM 100 MG/1
50 TABLET, FILM COATED ORAL
Refills: 0 | Status: DISCONTINUED | OUTPATIENT
Start: 2025-05-28 | End: 2025-05-29

## 2025-05-28 RX ORDER — LANOLIN/MINERAL OIL/PETROLATUM
2 OINTMENT (GRAM) OPHTHALMIC (EYE)
Refills: 0 | DISCHARGE

## 2025-05-28 RX ORDER — HYDROMORPHONE/SOD CHLOR,ISO/PF 2 MG/10 ML
0.3 SYRINGE (ML) INJECTION
Refills: 0 | Status: DISCONTINUED | OUTPATIENT
Start: 2025-05-28 | End: 2025-05-29

## 2025-05-28 RX ADMIN — Medication 30 MILLILITER(S): at 19:29

## 2025-05-28 RX ADMIN — Medication 5 MILLIGRAM(S): at 13:22

## 2025-05-28 RX ADMIN — Medication 4 MILLILITER(S): at 22:06

## 2025-05-28 RX ADMIN — LEVALBUTEROL HYDROCHLORIDE 0.63 MILLIGRAM(S): 1.25 SOLUTION RESPIRATORY (INHALATION) at 22:06

## 2025-05-28 RX ADMIN — Medication 1 TABLET(S): at 17:51

## 2025-05-28 RX ADMIN — Medication 975 MILLIGRAM(S): at 06:29

## 2025-05-28 RX ADMIN — DORNASE ALFA 2.5 MILLIGRAM(S): 1 SOLUTION RESPIRATORY (INHALATION) at 22:05

## 2025-05-28 RX ADMIN — Medication 10 MILLIGRAM(S): at 16:56

## 2025-05-28 RX ADMIN — Medication 1000 MILLIGRAM(S): at 13:37

## 2025-05-28 RX ADMIN — Medication 400 MILLIGRAM(S): at 13:22

## 2025-05-28 RX ADMIN — LOSARTAN POTASSIUM 50 MILLIGRAM(S): 100 TABLET, FILM COATED ORAL at 21:10

## 2025-05-28 RX ADMIN — HEPARIN SODIUM 5000 UNIT(S): 1000 INJECTION INTRAVENOUS; SUBCUTANEOUS at 06:29

## 2025-05-28 RX ADMIN — Medication 40 MILLIGRAM(S): at 21:10

## 2025-05-28 RX ADMIN — HEPARIN SODIUM 5000 UNIT(S): 1000 INJECTION INTRAVENOUS; SUBCUTANEOUS at 21:10

## 2025-05-28 RX ADMIN — Medication 400 MILLIGRAM(S): at 23:19

## 2025-05-28 RX ADMIN — SODIUM CHLORIDE 30 MILLILITER(S): 9 INJECTION, SOLUTION INTRAVENOUS at 06:28

## 2025-05-28 RX ADMIN — Medication 30 MILLILITER(S): at 13:22

## 2025-05-28 RX ADMIN — METOPROLOL SUCCINATE 2.5 MILLIGRAM(S): 50 TABLET, EXTENDED RELEASE ORAL at 16:00

## 2025-05-28 RX ADMIN — Medication 30 MILLILITER(S): at 19:13

## 2025-05-28 RX ADMIN — SODIUM CHLORIDE 30 MILLILITER(S): 9 INJECTION, SOLUTION INTRAVENOUS at 19:29

## 2025-05-28 RX ADMIN — HEPARIN SODIUM 5000 UNIT(S): 1000 INJECTION INTRAVENOUS; SUBCUTANEOUS at 13:22

## 2025-05-28 NOTE — PATIENT PROFILE ADULT - FALL HARM RISK - HARM RISK INTERVENTIONS

## 2025-05-28 NOTE — BRIEF OPERATIVE NOTE - COMMENTS
I, Moody Barrios PA-C provided direct first assist support to the surgeon during this surgical procedure. My involvement included positioning, prepping and draping the patient prior to surgery, robotic port placement, robotic docking, robotic instrument insertion and removal, ensuring clear visibility and exposure for the surgeon by using instruments such as retractors, suction and sponges, stapling tissues and vessels, retrieving specimens from the operative field, closing surgical incisions, undocking the robot and dressing wounds.  As well as other tasks as directed by the surgeon.

## 2025-05-28 NOTE — BRIEF OPERATIVE NOTE - NSICDXBRIEFPROCEDURE_GEN_ALL_CORE_FT
PROCEDURES:  Lobectomy, lung, with bronchoscopy 28-May-2025 12:42:13  Moody Barrios  Robotic assisted procedure, thoracoscopic 28-May-2025 12:42:22 Single Port Moody Barrios  Robot-assisted mediastinal lymphadenectomy 28-May-2025 12:42:36  Moody Barrios

## 2025-05-28 NOTE — ASU PREOP CHECKLIST - STERILIZATION AFFIRMATION
· We are ordering 72 hours ambulatory video EEG. Company called [employer] will contact you within the next 2-3 days to schedule the test. [employer] contact: [phone]. Please call our office approximately 2 weeks after the EEG regarding results.  · Follow up in 1 year.  
n/a

## 2025-05-29 ENCOUNTER — TRANSCRIPTION ENCOUNTER (OUTPATIENT)
Age: 70
End: 2025-05-29

## 2025-05-29 VITALS
SYSTOLIC BLOOD PRESSURE: 137 MMHG | DIASTOLIC BLOOD PRESSURE: 74 MMHG | RESPIRATION RATE: 20 BRPM | OXYGEN SATURATION: 96 % | HEART RATE: 89 BPM

## 2025-05-29 LAB
ANION GAP SERPL CALC-SCNC: 16 MMOL/L — HIGH (ref 7–14)
BUN SERPL-MCNC: 14 MG/DL — SIGNIFICANT CHANGE UP (ref 7–23)
CALCIUM SERPL-MCNC: 9 MG/DL — SIGNIFICANT CHANGE UP (ref 8.4–10.5)
CHLORIDE SERPL-SCNC: 94 MMOL/L — LOW (ref 98–107)
CO2 SERPL-SCNC: 21 MMOL/L — LOW (ref 22–31)
CREAT SERPL-MCNC: 0.55 MG/DL — SIGNIFICANT CHANGE UP (ref 0.5–1.3)
EGFR: 100 ML/MIN/1.73M2 — SIGNIFICANT CHANGE UP
EGFR: 100 ML/MIN/1.73M2 — SIGNIFICANT CHANGE UP
GLUCOSE SERPL-MCNC: 147 MG/DL — HIGH (ref 70–99)
HCT VFR BLD CALC: 41.9 % — SIGNIFICANT CHANGE UP (ref 34.5–45)
HGB BLD-MCNC: 14.6 G/DL — SIGNIFICANT CHANGE UP (ref 11.5–15.5)
MAGNESIUM SERPL-MCNC: 2.1 MG/DL — SIGNIFICANT CHANGE UP (ref 1.6–2.6)
MCHC RBC-ENTMCNC: 31.4 PG — SIGNIFICANT CHANGE UP (ref 27–34)
MCHC RBC-ENTMCNC: 34.8 G/DL — SIGNIFICANT CHANGE UP (ref 32–36)
MCV RBC AUTO: 90.1 FL — SIGNIFICANT CHANGE UP (ref 80–100)
NRBC # BLD AUTO: 0 K/UL — SIGNIFICANT CHANGE UP (ref 0–0)
NRBC # FLD: 0 K/UL — SIGNIFICANT CHANGE UP (ref 0–0)
NRBC BLD AUTO-RTO: 0 /100 WBCS — SIGNIFICANT CHANGE UP (ref 0–0)
PHOSPHATE SERPL-MCNC: 3.3 MG/DL — SIGNIFICANT CHANGE UP (ref 2.5–4.5)
PLATELET # BLD AUTO: 297 K/UL — SIGNIFICANT CHANGE UP (ref 150–400)
POTASSIUM SERPL-MCNC: 3.7 MMOL/L — SIGNIFICANT CHANGE UP (ref 3.5–5.3)
POTASSIUM SERPL-SCNC: 3.7 MMOL/L — SIGNIFICANT CHANGE UP (ref 3.5–5.3)
RBC # BLD: 4.65 M/UL — SIGNIFICANT CHANGE UP (ref 3.8–5.2)
RBC # FLD: 13.2 % — SIGNIFICANT CHANGE UP (ref 10.3–14.5)
SODIUM SERPL-SCNC: 131 MMOL/L — LOW (ref 135–145)
WBC # BLD: 16.38 K/UL — HIGH (ref 3.8–10.5)
WBC # FLD AUTO: 16.38 K/UL — HIGH (ref 3.8–10.5)

## 2025-05-29 PROCEDURE — 99233 SBSQ HOSP IP/OBS HIGH 50: CPT

## 2025-05-29 PROCEDURE — 71045 X-RAY EXAM CHEST 1 VIEW: CPT | Mod: 26,76

## 2025-05-29 RX ORDER — OXYCODONE HYDROCHLORIDE 30 MG/1
5 TABLET ORAL
Refills: 0 | Status: DISCONTINUED | OUTPATIENT
Start: 2025-05-29 | End: 2025-05-29

## 2025-05-29 RX ORDER — LIDOCAINE HYDROCHLORIDE 20 MG/ML
1 JELLY TOPICAL DAILY
Refills: 0 | Status: DISCONTINUED | OUTPATIENT
Start: 2025-05-29 | End: 2025-05-29

## 2025-05-29 RX ORDER — MELOXICAM 15 MG/1
0.5 TABLET ORAL
Refills: 0 | DISCHARGE

## 2025-05-29 RX ORDER — LABETALOL HYDROCHLORIDE 200 MG/1
10 TABLET, FILM COATED ORAL ONCE
Refills: 0 | Status: COMPLETED | OUTPATIENT
Start: 2025-05-29 | End: 2025-05-29

## 2025-05-29 RX ORDER — LOSARTAN POTASSIUM 100 MG/1
100 TABLET, FILM COATED ORAL DAILY
Refills: 0 | Status: DISCONTINUED | OUTPATIENT
Start: 2025-05-29 | End: 2025-05-29

## 2025-05-29 RX ORDER — ACETAMINOPHEN 500 MG/5ML
3 LIQUID (ML) ORAL
Qty: 0 | Refills: 0 | DISCHARGE
Start: 2025-05-29

## 2025-05-29 RX ORDER — OXYCODONE HYDROCHLORIDE 30 MG/1
1 TABLET ORAL
Qty: 12 | Refills: 0
Start: 2025-05-29 | End: 2025-06-01

## 2025-05-29 RX ORDER — ACETAMINOPHEN 500 MG/5ML
975 LIQUID (ML) ORAL EVERY 6 HOURS
Refills: 0 | Status: DISCONTINUED | OUTPATIENT
Start: 2025-05-29 | End: 2025-05-29

## 2025-05-29 RX ORDER — LOSARTAN POTASSIUM 100 MG/1
50 TABLET, FILM COATED ORAL ONCE
Refills: 0 | Status: COMPLETED | OUTPATIENT
Start: 2025-05-29 | End: 2025-05-29

## 2025-05-29 RX ADMIN — DORNASE ALFA 2.5 MILLIGRAM(S): 1 SOLUTION RESPIRATORY (INHALATION) at 10:42

## 2025-05-29 RX ADMIN — Medication 4 MILLILITER(S): at 10:42

## 2025-05-29 RX ADMIN — LABETALOL HYDROCHLORIDE 10 MILLIGRAM(S): 200 TABLET, FILM COATED ORAL at 02:09

## 2025-05-29 RX ADMIN — HEPARIN SODIUM 5000 UNIT(S): 1000 INJECTION INTRAVENOUS; SUBCUTANEOUS at 05:39

## 2025-05-29 RX ADMIN — Medication 30 MILLILITER(S): at 07:08

## 2025-05-29 RX ADMIN — LEVALBUTEROL HYDROCHLORIDE 0.63 MILLIGRAM(S): 1.25 SOLUTION RESPIRATORY (INHALATION) at 03:06

## 2025-05-29 RX ADMIN — Medication 400 MILLIGRAM(S): at 05:39

## 2025-05-29 RX ADMIN — LOSARTAN POTASSIUM 100 MILLIGRAM(S): 100 TABLET, FILM COATED ORAL at 10:19

## 2025-05-29 RX ADMIN — Medication 40 MILLIEQUIVALENT(S): at 05:39

## 2025-05-29 RX ADMIN — LIDOCAINE HYDROCHLORIDE 1 PATCH: 20 JELLY TOPICAL at 11:39

## 2025-05-29 RX ADMIN — Medication 1 TABLET(S): at 05:39

## 2025-05-29 RX ADMIN — Medication 975 MILLIGRAM(S): at 11:39

## 2025-05-29 RX ADMIN — LEVALBUTEROL HYDROCHLORIDE 0.63 MILLIGRAM(S): 1.25 SOLUTION RESPIRATORY (INHALATION) at 10:41

## 2025-05-29 RX ADMIN — LOSARTAN POTASSIUM 50 MILLIGRAM(S): 100 TABLET, FILM COATED ORAL at 00:35

## 2025-05-29 RX ADMIN — Medication 2 DROP(S): at 05:39

## 2025-05-29 NOTE — DISCHARGE NOTE PROVIDER - HOSPITAL COURSE
69 year old female, former smoker (22 years, 1-2 ppd), w/ hx of Breast Cancer (Stage IA - 2018, s/p right lumpectomy and radiation), GERD, HTN, Ulcerative colitis, osteopenia, presents for pre op evaluation with h/o lung nodule found incidentally in 2018 following breast surgery via Ct scan. Pt had been following up with ONC / Pulm, S/P Ct scan of abd./pelvis (2022) showed" something at the top of the lung". Ct scan of lung 03/2025. Pt was then referred to surgeon by oncologist. S/P PET / Ct  scan 04/2025 showed "that the growth on the top of the lung has gotten progressively bigger". Patient now s/p Single Port robotic Left upper lobectomy on 5/28/25 with Dr Ochoa. Post op course uncomplicated. Chest tube removed on POD 1, CXR stable and without acute changes. Home medications resumed, vitals stable, pt tolerating PO diet and pain well controlled. Patient cleared for discharge from surgical team with follow up.    Neuro: A+O x 3  CV: regular rate, regular rhythm  Pulm/chest: no accessory muscle use noted  Ext: Moves all extremities x 4  Skin: warm, well perfused, no rashes      ICU Vital Signs Last 24 Hrs  T(C): 37.7 (29 May 2025 08:00), Max: 37.7 (29 May 2025 08:00)  T(F): 99.9 (29 May 2025 08:00), Max: 99.9 (29 May 2025 08:00)  HR: 96 (29 May 2025 12:00) (83 - 107)  BP: 139/74 (29 May 2025 12:00) (139/74 - 169/89)  BP(mean): 92 (29 May 2025 12:00) (91 - 111)  ABP: --  ABP(mean): --  RR: 21 (29 May 2025 12:00) (17 - 28)  SpO2: 95% (29 May 2025 12:00) (93% - 100%)    O2 Parameters below as of 29 May 2025 12:00  Patient On (Oxygen Delivery Method): room air

## 2025-05-29 NOTE — DISCHARGE NOTE PROVIDER - NSDCFUADDINST_GEN_ALL_CORE_FT
- Leave dressing intact for 48 hrs by reinforcing with tape, if necessary. At that time you may remove the dressing and take a shower.   - Clean all incisions daily while showering with warm water and mild soap, pat dry with a clean towel. Place clean gauze over wound(s) if continual drainage.   - No bathing, swimming in a pool or the ocean until instructed by MD. DO NOT use creams or lotions on the wound (The suture will be removed in the office).    - Continue with daily ambulation and use of incentive spirometer.   - Do not lift heavy objects, operate machinery, drive or return to school/work until you are cleared by your surgeon at your follow up appointment.  -Take  your medications as ordered. Fill your prescriptions the day you are discharged and take according to the schedule you were given. Continue to take a stool softener if you are taking narcotic pain medications. AVOID medications such as ibuprofen or naproxen.  - Call the office if you experience any fevers, shortness of breath, chest pain, palpitations, excessive or purulent drainage from the incision site, persistent nausea, vomiting, leg swelling day or night. Call 911 or go to the nearest emergency room if any of these symptoms are severe.

## 2025-05-29 NOTE — DISCHARGE NOTE PROVIDER - NSDCFUADDAPPT_GEN_ALL_CORE_FT
- Please follow up with Dr. Kenn Ochoa in 7-10 business days.   - Call the outpatient Thoracic Surgery Office at [phone] to make your appointment.   - Obtain a Chest X-Ray 1-2 days prior to your appointment and have your results/images sent to the office or bring them to your appointment. A script has been provided to you upon discharge.     - Follow up with your primary care provider after discharge.

## 2025-05-29 NOTE — DISCHARGE NOTE PROVIDER - NSDCFUSCHEDAPPT_GEN_ALL_CORE_FT
Jack Rossi  [facility] Physician [facility]  CARDIOLOGY [address]  Scheduled Appointment: 06/24/2025

## 2025-05-29 NOTE — DISCHARGE NOTE NURSING/CASE MANAGEMENT/SOCIAL WORK - NSDCFUADDAPPT_GEN_ALL_CORE_FT
- Please follow up with Dr. Kenn Ochoa in 7-10 business days.   - Call the outpatient Thoracic Surgery Office at [phone] to make your appointment.   - Obtain a Chest X-Ray 1-2 days prior to your appointment and have your results/images sent to the office or bring them to your appointment. A script has been provided to you upon discharge.     - Follow up with your primary care provider after discharge.

## 2025-05-29 NOTE — PHYSICAL THERAPY INITIAL EVALUATION ADULT - ADDITIONAL COMMENTS
Pt lives with her  in a house with 4 steps to enter and a flight inside to basement. Pt did not use an assistive device and was independent with ADLs prior. Pt reports no falls in the past 6 months.  Pt left semi supine in bed in NAD, all lines intact, call bell in reach and RN Carlos Enrique/ Ekaterina made aware.

## 2025-05-29 NOTE — PHYSICAL THERAPY INITIAL EVALUATION ADULT - GENERAL OBSERVATIONS, REHAB EVAL
Chart reviewed and cleared for PT by SALIMA Monaco/ Ekaterina. Pt received sitting in bedside chair in NAD, all lines intact + telemetry, pulse ox, IV, chest tube, HR 90.

## 2025-05-29 NOTE — PHYSICAL THERAPY INITIAL EVALUATION ADULT - PERTINENT HX OF CURRENT PROBLEM, REHAB EVAL
Pt is a 69 year old female, former smoker (22 years, 1-2 ppd), w/ hx of Breast Cancer (Stage IA - 2018, s/p right lumpectomy and radiation), GERD, HTN, Ulcerative colitis, osteopenia, presents for pre op evaluation with h/o lung nodule found incidentally in 2018 following breast surgery via Ct scan. Pt had been following up with ONC / Pulm, S/P Ct scan of abd./pelvis (2022) showed" something at the top of the lung". Ct scan of lung 03/2025. Pt was then referred to surgeon by oncologist. S/P PET / Ct  scan 04/2025 showed "that the growth on the top of the lung has gotten progressively bigger". Now schedule for Flexible bronchoscopy, left Video Assisted Thoracoscopic Surgery (VATS), left upper lobectomy tentatively on 05/28/2025.

## 2025-05-29 NOTE — DISCHARGE NOTE NURSING/CASE MANAGEMENT/SOCIAL WORK - NSDCVIVACCINE_GEN_ALL_CORE_FT
Tdap; 31-Jul-2022 19:03; Yamile Coyne (RN); [employer]; [id number] (Exp. Date: 18-Jan-2024); IntraMuscular; Dorsogluteal Left.; 0.5 milliLiter(s); VIS (VIS Published: 09-May-2013, VIS Presented: 31-Jul-2022);

## 2025-05-29 NOTE — DISCHARGE NOTE NURSING/CASE MANAGEMENT/SOCIAL WORK - PATIENT PORTAL LINK FT
You can access the FollowMyHealth Patient Portal offered by [facility] by registering at the following website: [email or web address]. By joining [email or web address]’s FollowMyHealth portal, you will also be able to view your health information using other applications (apps) compatible with our system.

## 2025-05-29 NOTE — PROGRESS NOTE ADULT - SUBJECTIVE AND OBJECTIVE BOX
Anesthesia Pain Management Service    SUBJECTIVE: Patient is doing well with IV PCA and no significant problems reported.    Pain Scale Score	At rest: _3/10__ 	With Activity: _6/10__ 	[X ] Refer to charted pain scores    THERAPY:    [ ] IV PCA Morphine		[ ] 5 mg/mL	[ ] 1 mg/mL  [X ] IV PCA Hydromorphone	[ ] 5 mg/mL	[X ] 1 mg/mL  [ ] IV PCA Fentanyl		[ ] 50 micrograms/mL    Demand dose __0.2_ lockout __6_ (minutes) Continuous Rate _0__ Total: _2.3__   mg used (in past 24 hrs)      MEDICATIONS  (STANDING):  acetaminophen     Tablet .. 975 milliGRAM(s) Oral every 6 hours  artificial  tears Solution 2 Drop(s) Both EYES two times a day  calcium carbonate 1250 mG  + Vitamin D (OsCal 500 + D) 1 Tablet(s) Oral two times a day  chlorhexidine 2% Cloths 1 Application(s) Topical daily  dornase david Solution 2.5 milliGRAM(s) Inhalation daily  famotidine    Tablet 40 milliGRAM(s) Oral at bedtime  heparin   Injectable 5000 Unit(s) SubCutaneous every 8 hours  lactated ringers. 1000 milliLiter(s) (30 mL/Hr) IV Continuous <Continuous>  letrozole 2.5 milliGRAM(s) Oral at bedtime  levalbuterol Inhalation 0.63 milliGRAM(s) Inhalation every 6 hours  lidocaine   4% Patch 1 Patch Transdermal daily  losartan 100 milliGRAM(s) Oral daily  polyethylene glycol 3350 17 Gram(s) Oral daily  senna 2 Tablet(s) Oral at bedtime  sodium chloride 3%  Inhalation 4 milliLiter(s) Inhalation every 12 hours    MEDICATIONS  (PRN):  naloxone Injectable 0.1 milliGRAM(s) IV Push every 3 minutes PRN For ANY of the following changes in patient status:  A. RR LESS THAN 10 breaths per minute, B. Oxygen saturation LESS THAN 90%, C. Sedation score of 6  ondansetron Injectable 4 milliGRAM(s) IV Push every 6 hours PRN Nausea  oxyCODONE    IR 5 milliGRAM(s) Oral every 3 hours PRN Moderate to Severe Pain (4 - 10)      OBJECTIVE: Patient lying in bed. CT x1    Sedation Score:	[ X] Alert	[ ] Drowsy 	[ ] Arousable	[ ] Asleep	[ ] Unresponsive    Side Effects:	[X ] None	[ ] Nausea	[ ] Vomiting	[ ] Pruritus  		[ ] Other:    Vital Signs Last 24 Hrs  T(C): 37.7 (29 May 2025 08:00), Max: 37.7 (29 May 2025 08:00)  T(F): 99.9 (29 May 2025 08:00), Max: 99.9 (29 May 2025 08:00)  HR: 83 (29 May 2025 10:00) (83 - 107)  BP: 148/75 (29 May 2025 10:00) (140/72 - 169/89)  BP(mean): 96 (29 May 2025 10:00) (91 - 111)  RR: 18 (29 May 2025 10:00) (18 - 28)  SpO2: 97% (29 May 2025 10:00) (93% - 99%)    Parameters below as of 29 May 2025 10:00  Patient On (Oxygen Delivery Method): room air        ASSESSMENT/ PLAN    Therapy to  be:	[ ] Continue   [ X] Discontinued   [X ] Change to prn Analgesics    Documentation and Verification of current medications:   [X] Done	[ ] Not done, not elligible    Comments: PRN Oral/IV opioids and/or Adjuvant non-opioid medication to be ordered at this point.    Progress Note written now but Patient was seen earlier.
SCOTT MONTE      69y   Female   [id number]         erythromycin (Rash)             Daily Height in cm: 157.48 (28 May 2025 05:48)    Daily Drug Dosing Weight  Height (cm): 157.5 (28 May 2025 05:48)  Weight (kg): 52.6 (28 May 2025 05:48)  BMI (kg/m2): 21.2 (28 May 2025 05:48)  BSA (m2): 1.52 (28 May 2025 05:48)    HPI:   69 year old female, former smoker (22 years, 1-2 ppd), w/ hx of Breast Cancer (Stage IA - 2018, s/p right lumpectomy and radiation), GERD, HTN, Ulcerative colitis, osteopenia, presents for pre op evaluation with h/o lung nodule found incidentally in 2018 following breast surgery via Ct scan. Pt had been following up with ONC / Pulm, S/P Ct scan of abd./pelvis () showed" something at the top of the lung". Ct scan of lung 2025. Pt was then referred to surgeon by oncologist. S/P PET / Ct  scan 2025 showed "that the growth on the top of the lung has gotten progressively bigger". Now schedule for Flexible bronchoscopy, left Video Assisted Thoracoscopic Surgery (VATS), left upper lobectomy tentatively on 2025.  ?  CT Chest on 3/11/25 ([facility]):  - Multiple bilateral pulmonary nodules to large extent unchanged.  - New hazy changes in the posterior LLL likely due to interval scarring or postinfectious or inflammatory changes since prior chest CT.  - New semisolid/semigroundglass NURIA 1 cm nodule.  - Previously noted left major fissure nodular change as described above may also represent interval scarring.  - Few additional tiny bilateral lower lobe nodules.  - Likely small hiatal hernia. (21 May 2025 08:19)    CHIEF COMPLAINT: Follow up in ICU  for postoperative care of patient who is s/p lung surgery    Procedure: Uniportal VATS, NURIA Lobectomy 25                       Issues:              Lung nodule              Postop pain              Chest tube in place              At risk for respiratory complications              At risk for PCA infusion related complications  HTN (hypertension)  Osteopenia  GERD (gastroesophageal reflux disease)  History of radiation therapy  Ulcerative colitis  OA (osteoarthritis)  History of lumpectomy of right breast        Postop course:     Patient reports moderate pain at chest wall incision sites which is worse with coughing and deep breathing without associated fever or dyspnea. Pain is improved with use of PCA and  oral pain meds.         Home Medications:  calcium (as carbonate)-vitamin D 600 mg-62.5 mcg (2500 intl units) oral capsule: 1 cap(s) orally 2 times a day (28 May 2025 06:05)  famotidine 40 mg oral tablet: 1 tab(s) orally once a day (at bedtime) (28 May 2025 06:05)  ipratropium 42 mcg/inh (0.06%) nasal spray: 2 spray(s) intranasally 2 times a day (28 May 2025 06:05)  letrozole 2.5 mg oral tablet: 1 tab(s) orally once a day (at bedtime) (28 May 2025 06:05)  losartan 100 mg oral tablet: 1 tab(s) orally once a day (at bedtime) (28 May 2025 06:05)  meloxicam 15 mg oral tablet: 0.5 tab(s) orally once a day (21 May 2025 14:17)  mesalamine 1.2 g oral delayed release tablet: 2 tab(s) orally once a day (28 May 2025 06:05)  multiple vitamins/supplements: All supplements pre op as of 25 (21 May 2025 14:16)  Systane Complete preservative-free ophthalmic solution: 2 drop(s) in each eye 2 times a day (28 May 2025 06:05)    PAST MEDICAL & SURGICAL HISTORY:  HTN (hypertension)      Osteopenia      GERD (gastroesophageal reflux disease)      History of radiation therapy      Ulcerative colitis      OA (osteoarthritis)      Solitary pulmonary nodule      History of  section      History of lumpectomy of right breast      History of colonoscopy      Status post excisional biopsy        Vital Signs Last 24 Hrs  T(C): 36.7 (28 May 2025 11:55), Max: 36.8 (28 May 2025 05:48)  T(F): 98 (28 May 2025 11:55), Max: 98.2 (28 May 2025 05:48)  HR: 91 (28 May 2025 12:20) (87 - 91)  BP: 167/81 (28 May 2025 12:20) (144/80 - 167/81)  BP(mean): 104 (28 May 2025 12:20) (104 - 104)  RR: 20 (28 May 2025 12:20) (16 - 26)  SpO2: 94% (28 May 2025 12:20) (94% - 99%)    Parameters below as of 28 May 2025 12:20  Patient On (Oxygen Delivery Method): room air      I&O's Detail    CAPILLARY BLOOD GLUCOSE        Home Medications:  calcium (as carbonate)-vitamin D 600 mg-62.5 mcg (2500 intl units) oral capsule: 1 cap(s) orally 2 times a day (28 May 2025 06:05)  famotidine 40 mg oral tablet: 1 tab(s) orally once a day (at bedtime) (28 May 2025 06:05)  ipratropium 42 mcg/inh (0.06%) nasal spray: 2 spray(s) intranasally 2 times a day (28 May 2025 06:05)  letrozole 2.5 mg oral tablet: 1 tab(s) orally once a day (at bedtime) (28 May 2025 06:05)  losartan 100 mg oral tablet: 1 tab(s) orally once a day (at bedtime) (28 May 2025 06:05)  meloxicam 15 mg oral tablet: 0.5 tab(s) orally once a day (21 May 2025 14:17)  mesalamine 1.2 g oral delayed release tablet: 2 tab(s) orally once a day (28 May 2025 06:05)  multiple vitamins/supplements: All supplements pre op as of 25 (21 May 2025 14:16)  Systane Complete preservative-free ophthalmic solution: 2 drop(s) in each eye 2 times a day (28 May 2025 06:05)    MEDICATIONS  (STANDING):    MEDICATIONS  (PRN):      Physical exam:                             General:               Pt is awake, alert,  appears to be in pain but not in distress                                                  Neuro:                  Nonfocal                             Psych:                   A&Ox3                          Cardiovascular:   S1 & S2, regular                           Respiratory:         Air entry is fair and equal on both sides, has bilateral conducted sounds                           GI:                          Soft, nondistended and nontender, Bowel sounds active                            Ext:                        No cyanosis or edema     Labs:                                                                   CXR:  Postop changes, Right / Left chest tube in place, lungs are clear.     Plan:  General: 69yFemale s/p Uniportal VATS, NURIA Lobectomy 25 , experiencing  pain with deep breathing.                             Neuro:                                         Pain control with PCA  /  Tylenol PRN / Lidopatch                            Cardiovascular:                                          Telemetry (medical test) - Reviewed by me today independently. Pt is in normal sinus rhythm                                         HTN: Continue hemodynamic monitoring and restart Losartan                                        Continue hemodynamic monitoring to prevent decompensation.                            Respiratory:                                         Postop hypoxemia requiring O2 via nasal cannula probably due to postop pain - Wean nasal cannula for goal O2sat above 92%.                                              Obtain CXR . Encourage incentive spirometry.                                                   Chest PT and frequent suctioning.                                          Continue bronchodilators, inhaled steroids, Pulmozyme and inhaled 3% saline inhalations.                                         OOB to chair & ambulate w/ assistance.                                          Continuous pulse oximetry for support & to prevent decompensation.                                         Monitor chest tube output                                         Chest tube to  water seal                                                                                            GI                                         On puree diet, advance to  regular diet as tolerated     U.C: Continue mesalamine 1.2 g oral delayed release tablet: 2 tab(s) orally once a day                                          Continue G.I prophylaxis with Pepcid                                           Continue Zofran / Reglan for nausea - PRN                                         Continue bowel regimen	                                                                 Renal:                                         Continue LR  30cc/hr                                         Monitor I/Os and electrolytes                                                                                        Hem/ Onc:                                         DVT prophylaxis with SQ Heparin                                          Monitor chest tube output &  signs of bleeding.                                          Follow CBC in AM                           Infectious disease:                                            Monitor for fever / leukocytosis.                                          All surgical incision / chest tube  sites look clean                            Endocrine                                           No active issues                                                 Pertinent clinical, laboratory, radiographic, hemodynamic, echocardiographic, respiratory data, microbiologic data and chart were reviewed and analyzed frequently throughout the course of the day and night. Patient seen, examined and plan discussed with CT Surgeon Dr. Ochoa / CTICU team during rounds.  OOB to chair and ambulate with physical therapy as tolerated.   Status discussed with patient and updated plan of care.   I have spent 50 minutes with this patient  monitoring hemodynamic status, respiratory status and  coordinating care in the ICU.        Martir Lr MD                                                                    
ANESTHESIA POSTOP CHECK    69y Female POSTOP DAY 1 S/P LVATS    Vital Signs Last 24 Hrs  T(C): 37.7 (29 May 2025 08:00), Max: 37.7 (29 May 2025 08:00)  T(F): 99.9 (29 May 2025 08:00), Max: 99.9 (29 May 2025 08:00)  HR: 83 (29 May 2025 10:00) (83 - 107)  BP: 148/75 (29 May 2025 10:00) (140/72 - 169/89)  BP(mean): 96 (29 May 2025 10:00) (91 - 111)  RR: 18 (29 May 2025 10:00) (18 - 28)  SpO2: 97% (29 May 2025 10:00) (93% - 99%)    Parameters below as of 29 May 2025 10:00  Patient On (Oxygen Delivery Method): room air      I&O's Summary    28 May 2025 07:01  -  29 May 2025 07:00  --------------------------------------------------------  IN: 960 mL / OUT: 610 mL / NET: 350 mL    29 May 2025 07:01  -  29 May 2025 10:49  --------------------------------------------------------  IN: 250 mL / OUT: 605 mL / NET: -355 mL        [x ] NO APPARENT ANESTHESIA COMPLICATIONS      Comments: 
SCOTT MONTE                     [id number]    HPI:   69 year old female, former smoker (22 years, 1-2 ppd), w/ hx of Breast Cancer (Stage IA - 2018, s/p right lumpectomy and radiation), GERD, HTN, Ulcerative colitis, osteopenia, presents for pre op evaluation with h/o lung nodule found incidentally in 2018 following breast surgery via Ct scan. Pt had been following up with ONC / Pulm, S/P Ct scan of abd./pelvis () showed" something at the top of the lung". Ct scan of lung 2025. Pt was then referred to surgeon by oncologist. S/P PET / Ct  scan 2025 showed "that the growth on the top of the lung has gotten progressively bigger". Now schedule for Flexible bronchoscopy, left Video Assisted Thoracoscopic Surgery (VATS), left upper lobectomy tentatively on 2025.  ?  CT Chest on 3/11/25 ([facility]):  - Multiple bilateral pulmonary nodules to large extent unchanged.  - New hazy changes in the posterior LLL likely due to interval scarring or postinfectious or inflammatory changes since prior chest CT.  - New semisolid/semigroundglass NURIA 1 cm nodule.  - Previously noted left major fissure nodular change as described above may also represent interval scarring.  - Few additional tiny bilateral lower lobe nodules.  - Likely small hiatal hernia. (21 May 2025 08:19)      CHIEF COMPLAINT: Follow up in ICU  for postoperative care of patient who is s/p lung surgery    Procedure: Uniportal VATS, NURIA Lobectomy 25                       Issues:              Lung nodule              Postop pain              Chest tube in place              At risk for respiratory complications              At risk for PCA infusion related complications  HTN (hypertension)  Osteopenia  GERD (gastroesophageal reflux disease)  History of radiation therapy  Ulcerative colitis  OA (osteoarthritis)  History of lumpectomy of right breast      PAST MEDICAL & SURGICAL HISTORY:  HTN (hypertension)      Osteopenia      GERD (gastroesophageal reflux disease)      History of radiation therapy      Ulcerative colitis      OA (osteoarthritis)      Solitary pulmonary nodule      History of  section      History of lumpectomy of right breast      History of colonoscopy      Status post excisional biopsy      VITAL SIGNS:  Vital Signs Last 24 Hrs  T(C): 37.7 (29 May 2025 08:00), Max: 37.7 (29 May 2025 08:00)  T(F): 99.9 (29 May 2025 08:00), Max: 99.9 (29 May 2025 08:00)  HR: 94 (29 May 2025 08:00) (85 - 107)  BP: 144/89 (29 May 2025 08:00) (140/72 - 169/89)  BP(mean): 104 (29 May 2025 08:00) (91 - 111)  RR: 25 (29 May 2025 08:00) (19 - 28)  SpO2: 97% (29 May 2025 08:00) (93% - 99%)    Parameters below as of 29 May 2025 08:00  Patient On (Oxygen Delivery Method): room air      I/Os:   I&O's Detail    28 May 2025 07:01  -  29 May 2025 07:00  --------------------------------------------------------  IN:    IV PiggyBack: 300 mL    Lactated Ringers: 360 mL    Oral Fluid: 300 mL  Total IN: 960 mL    OUT:    Chest Tube (mL): 10 mL    Voided (mL): 600 mL  Total OUT: 610 mL    Total NET: 350 mL          CAPILLARY BLOOD GLUCOSE          =======================MEDICATIONS===================  MEDICATIONS  (STANDING):  acetaminophen   IVPB .. 1000 milliGRAM(s) IV Intermittent every 6 hours  artificial  tears Solution 2 Drop(s) Both EYES two times a day  calcium carbonate 1250 mG  + Vitamin D (OsCal 500 + D) 1 Tablet(s) Oral two times a day  chlorhexidine 2% Cloths 1 Application(s) Topical daily  dornase david Solution 2.5 milliGRAM(s) Inhalation daily  famotidine    Tablet 40 milliGRAM(s) Oral at bedtime  heparin   Injectable 5000 Unit(s) SubCutaneous every 8 hours  HYDROmorphone PCA (1 mG/mL) 30 milliLiter(s) PCA Continuous PCA Continuous  lactated ringers. 1000 milliLiter(s) (30 mL/Hr) IV Continuous <Continuous>  letrozole 2.5 milliGRAM(s) Oral at bedtime  levalbuterol Inhalation 0.63 milliGRAM(s) Inhalation every 6 hours  losartan 100 milliGRAM(s) Oral daily  polyethylene glycol 3350 17 Gram(s) Oral daily  senna 2 Tablet(s) Oral at bedtime  sodium chloride 3%  Inhalation 4 milliLiter(s) Inhalation every 12 hours    MEDICATIONS  (PRN):  HYDROmorphone PCA (1 mG/mL) Rescue Clinician Bolus 0.3 milliGRAM(s) IV Push every 15 minutes PRN for Pain Scale GREATER THAN 6  naloxone Injectable 0.1 milliGRAM(s) IV Push every 3 minutes PRN For ANY of the following changes in patient status:  A. RR LESS THAN 10 breaths per minute, B. Oxygen saturation LESS THAN 90%, C. Sedation score of 6  ondansetron Injectable 4 milliGRAM(s) IV Push every 6 hours PRN Nausea      PHYSICAL EXAM============================  General:                         Awake, alert, not in any distress  Neuro:                            Moving all extremities to commands.   Respiratory:	Air entry fair and  bilateral conducted sounds                                           Effort even and unlabored.  CV:		Regular rate and rhythm. Normal S1/S2                                          Distal pulses present.  Abdomen:	                     Soft, non-distended. Bowel sounds present   Skin:		No rash.  Extremities:	Warm, no cyanosis or edema.  Palpable pulses    ============================LABS=========================                        14.6   16.38 )-----------( 297      ( 29 May 2025 02:35 )             41.9     05-    131[L]  |  94[L]  |  14  ----------------------------<  147[H]  3.7   |  21[L]  |  0.55    Ca    9.0      29 May 2025 02:35  Phos  3.3     -  Mg     2.10     -            Urinalysis Basic - ( 29 May 2025 02:35 )    Color: x / Appearance: x / SG: x / pH: x  Gluc: 147 mg/dL / Ketone: x  / Bili: x / Urobili: x   Blood: x / Protein: x / Nitrite: x   Leuk Esterase: x / RBC: x / WBC x   Sq Epi: x / Non Sq Epi: x / Bacteria: x      A/P:  69yFemale s/p Uniportal VATS, NURIA Lobectomy 25 , experiencing  pain with deep breathing.                             Neuro:                                         Pain control with PCA  /  Tylenol PRN / Lidopatch                            Cardiovascular:                                          Telemetry (medical test) - Reviewed by me today independently. Pt is in normal sinus rhythm                                         HTN: Continue hemodynamic monitoring and restart Losartan                                        Continue hemodynamic monitoring to prevent decompensation.                            Respiratory:                                         Postop hypoxemia requiring O2 via nasal cannula probably due to postop pain - Wean nasal cannula for goal O2sat above 92%.                                         Encourage incentive spirometry.                                                   Chest PT and frequent suctioning.                                          Continue bronchodilators, inhaled steroids, Pulmozyme and inhaled 3% saline inhalations.                                         OOB to chair & ambulate w/ assistance.                                          Continuous pulse oximetry for support & to prevent decompensation.                                         Monitor chest tube output                                         Chest tube to  water seal                                                                                            GI                                         On  regular diet as tolerated     U.C: Continue mesalamine 1.2 g oral delayed release tablet: 2 tab(s) orally once a day                                          Continue G.I prophylaxis with Pepcid                                           Continue Zofran / Reglan for nausea - PRN                                         Continue bowel regimen	                                                                 Renal:                                         Continue LR  30cc/hr                                         Monitor I/Os and electrolytes                                                                                        Hem/ Onc:                                         DVT prophylaxis with SQ Heparin                                          Monitor chest tube output &  signs of bleeding.                                          Follow CBC in AM                           Infectious disease:                                            Monitor for fever / leukocytosis.                                          All surgical incision / chest tube  sites look clean                            Endocrine                                           No active issues                                                 Pertinent clinical, laboratory, radiographic, hemodynamic, echocardiographic, respiratory data, microbiologic data and chart were reviewed and analyzed frequently throughout the course of the day and night. Patient seen, examined and plan discussed with CT Surgeon Dr. Ochoa / CTICU team during rounds.  OOB to chair and ambulate with physical therapy as tolerated.   Status discussed with patient and updated plan of care.     I have spent 50 minutes with this patient  monitoring hemodynamic status, respiratory status and  coordinating care in the ICU.    Iris SOTOP

## 2025-05-29 NOTE — DISCHARGE NOTE PROVIDER - NSDCCPCAREPLAN_GEN_ALL_CORE_FT
PRINCIPAL DISCHARGE DIAGNOSIS  Diagnosis: Lung field abnormal  Assessment and Plan of Treatment:

## 2025-05-29 NOTE — DISCHARGE NOTE PROVIDER - NSDCMRMEDTOKEN_GEN_ALL_CORE_FT
acetaminophen 325 mg oral tablet: 3 tab(s) orally every 6 hours  calcium (as carbonate)-vitamin D 600 mg-62.5 mcg (2500 intl units) oral capsule: 1 cap(s) orally 2 times a day  famotidine 40 mg oral tablet: 1 tab(s) orally once a day (at bedtime)  Imaging: CXR PA/LAT post left thoracic surgery  ipratropium 42 mcg/inh (0.06%) nasal spray: 2 spray(s) intranasally 2 times a day  letrozole 2.5 mg oral tablet: 1 tab(s) orally once a day (at bedtime)  losartan 100 mg oral tablet: 1 tab(s) orally once a day (at bedtime)  mesalamine 1.2 g oral delayed release tablet: 2 tab(s) orally once a day  multiple vitamins/supplements: All supplements pre op as of 05/21/25  oxyCODONE 5 mg oral tablet: 1 tab(s) orally every 8 hours as needed for  severe pain MDD: 3  Systane Complete preservative-free ophthalmic solution: 2 drop(s) in each eye 2 times a day

## 2025-05-29 NOTE — DISCHARGE NOTE NURSING/CASE MANAGEMENT/SOCIAL WORK - FINANCIAL ASSISTANCE
[facility] provides services at a reduced cost to those who are determined to be eligible through [facility]’s financial assistance program. Information regarding [facility]’s financial assistance program can be found by going to [email or web address] or by calling [phone].

## 2025-05-29 NOTE — PHYSICAL THERAPY INITIAL EVALUATION ADULT - DID THE PATIENT HAVE SURGERY?
yes Flexible bronchoscopy, left Video Assisted Thoracoscopic Surgery (VATS), left upper lobectomy/yes

## 2025-05-29 NOTE — DISCHARGE NOTE PROVIDER - CARE PROVIDER_API CALL
Kenn Ochoa  Thoracic Surgery  [address] [address] [place] [address], [address] [place] [address]  Phone: [phone]  Fax: [phone]  Follow Up Time:

## 2025-06-13 ENCOUNTER — APPOINTMENT (OUTPATIENT)
Dept: THORACIC SURGERY | Facility: CLINIC | Age: 70
End: 2025-06-13
Payer: COMMERCIAL

## 2025-06-13 VITALS
DIASTOLIC BLOOD PRESSURE: 81 MMHG | SYSTOLIC BLOOD PRESSURE: 144 MMHG | HEART RATE: 81 BPM | OXYGEN SATURATION: 96 % | RESPIRATION RATE: 18 BRPM | HEIGHT: 64 IN

## 2025-06-13 PROCEDURE — 99024 POSTOP FOLLOW-UP VISIT: CPT

## 2025-06-16 LAB — SURGICAL PATHOLOGY STUDY: SIGNIFICANT CHANGE UP

## 2025-06-17 ENCOUNTER — APPOINTMENT (OUTPATIENT)
Dept: RADIOLOGY | Facility: HOSPITAL | Age: 70
End: 2025-06-17

## 2025-06-17 ENCOUNTER — RESULT REVIEW (OUTPATIENT)
Age: 70
End: 2025-06-17

## 2025-06-17 ENCOUNTER — OUTPATIENT (OUTPATIENT)
Dept: OUTPATIENT SERVICES | Facility: HOSPITAL | Age: 70
LOS: 1 days | End: 2025-06-17
Payer: COMMERCIAL

## 2025-06-17 ENCOUNTER — APPOINTMENT (OUTPATIENT)
Dept: THORACIC SURGERY | Facility: CLINIC | Age: 70
End: 2025-06-17
Payer: COMMERCIAL

## 2025-06-17 VITALS
WEIGHT: 116 LBS | RESPIRATION RATE: 16 BRPM | HEIGHT: 64 IN | SYSTOLIC BLOOD PRESSURE: 152 MMHG | OXYGEN SATURATION: 98 % | BODY MASS INDEX: 19.81 KG/M2 | HEART RATE: 79 BPM | DIASTOLIC BLOOD PRESSURE: 89 MMHG

## 2025-06-17 DIAGNOSIS — Z98.890 OTHER SPECIFIED POSTPROCEDURAL STATES: Chronic | ICD-10-CM

## 2025-06-17 DIAGNOSIS — J90 PLEURAL EFFUSION, NOT ELSEWHERE CLASSIFIED: ICD-10-CM

## 2025-06-17 DIAGNOSIS — Z98.891 HISTORY OF UTERINE SCAR FROM PREVIOUS SURGERY: Chronic | ICD-10-CM

## 2025-06-17 PROCEDURE — 71046 X-RAY EXAM CHEST 2 VIEWS: CPT | Mod: 26

## 2025-06-17 PROCEDURE — 99024 POSTOP FOLLOW-UP VISIT: CPT

## 2025-06-24 ENCOUNTER — APPOINTMENT (OUTPATIENT)
Dept: CARDIOLOGY | Facility: CLINIC | Age: 70
End: 2025-06-24
Payer: COMMERCIAL

## 2025-06-24 VITALS
BODY MASS INDEX: 19.81 KG/M2 | OXYGEN SATURATION: 96 % | HEIGHT: 64 IN | DIASTOLIC BLOOD PRESSURE: 81 MMHG | SYSTOLIC BLOOD PRESSURE: 152 MMHG | HEART RATE: 84 BPM | WEIGHT: 116 LBS

## 2025-06-24 PROBLEM — C80.1 ADENOCARCINOMA: Status: ACTIVE | Noted: 2025-06-17

## 2025-06-24 PROCEDURE — 99213 OFFICE O/P EST LOW 20 MIN: CPT

## 2025-09-03 ENCOUNTER — APPOINTMENT (OUTPATIENT)
Dept: CT IMAGING | Facility: CLINIC | Age: 70
End: 2025-09-03

## 2025-09-03 ENCOUNTER — OUTPATIENT (OUTPATIENT)
Dept: OUTPATIENT SERVICES | Facility: HOSPITAL | Age: 70
LOS: 1 days | End: 2025-09-03
Payer: COMMERCIAL

## 2025-09-03 DIAGNOSIS — Z98.890 OTHER SPECIFIED POSTPROCEDURAL STATES: Chronic | ICD-10-CM

## 2025-09-03 DIAGNOSIS — C80.1 MALIGNANT (PRIMARY) NEOPLASM, UNSPECIFIED: ICD-10-CM

## 2025-09-03 DIAGNOSIS — Z98.891 HISTORY OF UTERINE SCAR FROM PREVIOUS SURGERY: Chronic | ICD-10-CM

## 2025-09-03 PROCEDURE — 71250 CT THORAX DX C-: CPT | Mod: 26

## 2025-09-03 PROCEDURE — 71250 CT THORAX DX C-: CPT

## 2025-09-16 ENCOUNTER — APPOINTMENT (OUTPATIENT)
Dept: THORACIC SURGERY | Facility: CLINIC | Age: 70
End: 2025-09-16
Payer: COMMERCIAL

## 2025-09-16 ENCOUNTER — NON-APPOINTMENT (OUTPATIENT)
Age: 70
End: 2025-09-16

## 2025-09-16 VITALS
HEART RATE: 79 BPM | BODY MASS INDEX: 19.29 KG/M2 | OXYGEN SATURATION: 98 % | HEIGHT: 64 IN | SYSTOLIC BLOOD PRESSURE: 158 MMHG | DIASTOLIC BLOOD PRESSURE: 85 MMHG | WEIGHT: 113 LBS | RESPIRATION RATE: 16 BRPM

## 2025-09-16 DIAGNOSIS — R91.1 SOLITARY PULMONARY NODULE: ICD-10-CM

## 2025-09-16 PROCEDURE — 99213 OFFICE O/P EST LOW 20 MIN: CPT

## (undated) DEVICE — DRAPE MAGNETIC INSTRUMENT MEDIUM

## (undated) DEVICE — Device

## (undated) DEVICE — ELCTR GROUNDING PAD ADULT COVIDIEN

## (undated) DEVICE — TROCAR SURGIQUEST AIRSEAL 8MM X 100MM

## (undated) DEVICE — LAP PAD 4 X 18"

## (undated) DEVICE — DRAPE IOBAN 33" X 23"

## (undated) DEVICE — TAPE SILK 3"

## (undated) DEVICE — SP COVER CAMERA SHEATH

## (undated) DEVICE — TROCAR COVIDIEN VERSAPORT BLADELESS OPTICAL 5MM STANDARD

## (undated) DEVICE — GOWN XL

## (undated) DEVICE — PACK ROBOTIC

## (undated) DEVICE — TUBING AIRSEAL TRI-LUMEN FILTERED

## (undated) DEVICE — VENODYNE/SCD SLEEVE CALF MEDIUM

## (undated) DEVICE — DRSG MASTISOL

## (undated) DEVICE — DRSG TEGADERM 2.5 X 3"

## (undated) DEVICE — CONNECTOR REDUCING STRAIGHT 3/8X0.25"

## (undated) DEVICE — SUT PROLENE 0 30" CT-1

## (undated) DEVICE — STAPLER COVIDIEN ENDO GIA XL HANDLE

## (undated) DEVICE — TUBING STRYKEFLOW II SUCTION / IRRIGATOR

## (undated) DEVICE — ELCTR BOVIE PENCIL SMOKE EVACUATION

## (undated) DEVICE — DRSG BENZOIN 0.6CC

## (undated) DEVICE — SP DRAPE ARM

## (undated) DEVICE — DRSG GAUZE PACKTNER ROLL

## (undated) DEVICE — CHEST DRAIN OASIS DRY SUCTION WATER SEAL

## (undated) DEVICE — SUT MONOCRYL 4-0 27" PS-2 UNDYED

## (undated) DEVICE — WARMING BLANKET UPPER ADULT

## (undated) DEVICE — FORCEP MARYLAND BIPOLAR

## (undated) DEVICE — SP SHEATH

## (undated) DEVICE — GOWN XXXL

## (undated) DEVICE — FORCEP FENESTRATED BIPOLAR

## (undated) DEVICE — SUT VICRYL 0 27" UR-6